# Patient Record
Sex: MALE | HISPANIC OR LATINO | Employment: FULL TIME | URBAN - METROPOLITAN AREA
[De-identification: names, ages, dates, MRNs, and addresses within clinical notes are randomized per-mention and may not be internally consistent; named-entity substitution may affect disease eponyms.]

---
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
## 2019-09-12 ENCOUNTER — TELEPHONE (OUTPATIENT)
Dept: SCHEDULING | Facility: IMAGING CENTER | Age: 19
End: 2019-09-12

## 2019-12-17 ENCOUNTER — TELEPHONE (OUTPATIENT)
Dept: SCHEDULING | Facility: IMAGING CENTER | Age: 19
End: 2019-12-17

## 2020-01-27 ENCOUNTER — OFFICE VISIT (OUTPATIENT)
Dept: MEDICAL GROUP | Facility: PHYSICIAN GROUP | Age: 20
End: 2020-01-27
Payer: COMMERCIAL

## 2020-01-27 VITALS
TEMPERATURE: 98.2 F | BODY MASS INDEX: 20.73 KG/M2 | HEIGHT: 63 IN | HEART RATE: 72 BPM | DIASTOLIC BLOOD PRESSURE: 62 MMHG | SYSTOLIC BLOOD PRESSURE: 112 MMHG | RESPIRATION RATE: 16 BRPM | WEIGHT: 117 LBS | OXYGEN SATURATION: 97 %

## 2020-01-27 DIAGNOSIS — F51.4 NIGHT TERRORS, ADULT: ICD-10-CM

## 2020-01-27 DIAGNOSIS — G43.109 MIGRAINE WITH AURA AND WITHOUT STATUS MIGRAINOSUS, NOT INTRACTABLE: ICD-10-CM

## 2020-01-27 DIAGNOSIS — Z11.4 ENCOUNTER FOR SCREENING FOR HIV: ICD-10-CM

## 2020-01-27 DIAGNOSIS — Z13.21 ENCOUNTER FOR VITAMIN DEFICIENCY SCREENING: ICD-10-CM

## 2020-01-27 DIAGNOSIS — Z13.6 SCREENING FOR CARDIOVASCULAR CONDITION: ICD-10-CM

## 2020-01-27 DIAGNOSIS — Z13.228 SCREENING FOR METABOLIC DISORDER: ICD-10-CM

## 2020-01-27 DIAGNOSIS — Z11.3 SCREEN FOR SEXUALLY TRANSMITTED DISEASES: ICD-10-CM

## 2020-01-27 DIAGNOSIS — F32.A DEPRESSION, UNSPECIFIED DEPRESSION TYPE: ICD-10-CM

## 2020-01-27 DIAGNOSIS — Z23 NEED FOR VACCINATION: ICD-10-CM

## 2020-01-27 DIAGNOSIS — Z13.29 SCREENING FOR THYROID DISORDER: ICD-10-CM

## 2020-01-27 PROCEDURE — 99214 OFFICE O/P EST MOD 30 MIN: CPT | Mod: 25 | Performed by: NURSE PRACTITIONER

## 2020-01-27 PROCEDURE — 90472 IMMUNIZATION ADMIN EACH ADD: CPT | Performed by: NURSE PRACTITIONER

## 2020-01-27 PROCEDURE — 90715 TDAP VACCINE 7 YRS/> IM: CPT | Performed by: NURSE PRACTITIONER

## 2020-01-27 PROCEDURE — 90471 IMMUNIZATION ADMIN: CPT | Performed by: NURSE PRACTITIONER

## 2020-01-27 PROCEDURE — 90686 IIV4 VACC NO PRSV 0.5 ML IM: CPT | Performed by: NURSE PRACTITIONER

## 2020-01-27 SDOH — HEALTH STABILITY: MENTAL HEALTH: HOW MANY STANDARD DRINKS CONTAINING ALCOHOL DO YOU HAVE ON A TYPICAL DAY?: 1 OR 2

## 2020-01-27 SDOH — HEALTH STABILITY: MENTAL HEALTH: HOW OFTEN DO YOU HAVE 6 OR MORE DRINKS ON ONE OCCASION?: NEVER

## 2020-01-27 SDOH — HEALTH STABILITY: MENTAL HEALTH: HOW OFTEN DO YOU HAVE A DRINK CONTAINING ALCOHOL?: MONTHLY OR LESS

## 2020-01-27 ASSESSMENT — PATIENT HEALTH QUESTIONNAIRE - PHQ9
SUM OF ALL RESPONSES TO PHQ QUESTIONS 1-9: 22
CLINICAL INTERPRETATION OF PHQ2 SCORE: 6
5. POOR APPETITE OR OVEREATING: 2 - MORE THAN HALF THE DAYS

## 2020-01-27 NOTE — PROGRESS NOTES
"  Chief Complaint   Patient presents with   • [facility]     general check up/ referral: psych         Subjective:     Jeffry Gómez is a 19 y.o. male presenting to establish care.  He currently works at a local [employer] while going to school and studying anthropology.  He hopes to be an ethnobiologist. He is very interested in the medicinal properties of plants.    Mental health concerns: This is a new unstable problem.  He does have a lot of concerns regarding his mental health.  He reports several years of progressively worsening depression with intermittent episodes of what he describes as \"hypomania\" where he feels very powerful, energized, and cannot sleep.  This does cause a lot of sleep disturbances.  Feels at times he \"disassociates from his body\" and does not feel present the moment.  Does report suicidal thoughts, but is without a plan.  Denies homicidal ideation.  He feels that this is been going on for several years, but he has been very dissuaded from seeking out help due to his family's culture of avoiding mental health care.  Reports very strong family history of depression and bipolar disorder.  He feels that there are likely other diagnoses that have just gone undiscovered and untreated.    Sleep disturbances: This is a new unstable problem.  Patient reports night terrors and insomnia.  He feels that his insomnia is likely associated with what he calls hypomanic episodes at night.  This leads to a lot of daytime fatigue.  His sleep terrors do get worse during periods of intense depression and alternating hypomania.  Does use over-the-counter valerian root to sleep.  Denies snoring, denies gasping for breath.    Migraines: This is a chronic unstable problem.  Patient reports roughly daily migraines/headaches.  Does not take anything over-the-counter to treat this.  Does report an overall lack of hydration.  Reports seeing black \"butterflies\" in the periphery of his vision prior to intense headache. " " During these episodes, he reports light sensitivity.    Does get his eyes checked regularly, wears glasses.  Does not see a dentist routinely, but plans to get reestablished.  Is sexually active and uses barrier contraception.  Recently diagnosed with chlamydia but treated.  We will do a test for cure with labs.    Regarding vaccinations, due for second meningitis B, Tdap, flu.  Consented to flu and Tdap today.      Review of systems:      Denies chest pain, shortness of breath, sore throat, difficulty swallowing, new cough, dizziness, changes in bowel or bladder habits, decreased sensation, decreased strength, numbness or tingling, rash or skin concerns, changes in vision, fatigue, myalgias, painful or swollen lymph nodes.     No current outpatient medications on file.    Allergies, past medical history, past surgical history, family history, social history reviewed and updated    Objective:     Vitals: /62 (BP Location: Left arm, Patient Position: Sitting)   Pulse 72   Temp 36.8 °C (98.2 °F)   Resp 16   Ht 1.6 m (5' 3\")   Wt 53.1 kg (117 lb)   SpO2 97%   BMI 20.73 kg/m²   General: Alert, cooperative, dressed appropriately for weather / situation  Eyes:Normocephalic.  EOMI, no icterus or pallor.  Conjunctivae clear without erythema / irritation.  ENT:  External ears developed; Bilat TMs visualized; appear pearly without bulging, effusion, or erythema; crisp light reflex.  Bilateral nasal turbinates nonerythematous, nonedematous.  Oropharynx non-erythematous, mucous membranes moist; Tonsils grade 2    Lymph: Neck supple, absent of cervical or supraclavicular lymphadenopathy.  Thyroid palpated, free of masses or goiter.   Heart: Regular rate and rhythm.  S1 and S2 normal.  No murmurs auscultated; no murmurs / bruits heard over bilateral carotids.  Bilateral radial pulses strong and equal.  Bilateral posterior tibial pulses strong and equal.  Respiratory: Normal respiratory effort.  Clear to auscultation " bilaterally.  AP ratio 1:2   Abdomen: Non-distended; Soft, nontender with deep palpation; no palpable organomegaly present  Skin: Visible skin intact, dry, without rash.  Musculoskeletal: Gait is normal.  Bilateral  strength strong equal.  Moves extremities freely and equally bilaterally  Neuro:  AAOx3 Visual tracking intact, no nystagmus;   Psych: Flat affect, low energy, judgement is good, memory is intact, grooming is appropriate.    Assessment/Plan:     Jeffry was seen today for establish care.    Diagnoses and all orders for this visit:    Migraine with aura and without status migrainosus, not intractable: Advised patient to drink roughly 2 L of water per day as well as try over-the-counter combination migraine medicine with acetaminophen, caffeine, NSAID.  Patient start this at the onset of aura.  If he continues to have migraines which are not treated sufficiently with over-the-counter medication, he should reach back out to me or return to clinic we can discuss possible treatment of abortive therapy.    Depression, unspecified depression type: Patient very open and willing to seek out mental health specialty care at this time.  Though his PHQ 9 score is very elevated and medication treatment is warranted, there is a large potential for negative effects of SSRIs if he does have a mood disorder such as bipolar, so instead I will defer to psych and mental health and place the referral as urgent.    -     CBC WITH DIFFERENTIAL; Future  -     Comp Metabolic Panel; Future  -     TSH WITH REFLEX TO FT4; Future  -     Patient has been identified as having a positive depression screening. Appropriate orders and counseling have been given.  -     REFERRAL TO PSYCHIATRY    Night terrors, adult: These seem to be associated with his current mental health concerns, so would like to see if these would lessen in severity or resolve with appropriate treatment and care.  Should they continue, we will consider a sleep  study.    Screening for cardiovascular condition    Encounter for screening for HIV  -     HIV AG/AB COMBO ASSAY SCREENING; Future    Screening for metabolic disorder  -     CBC WITH DIFFERENTIAL; Future  -     VITAMIN B12; Future  -     VITAMIN D,25 HYDROXY; Future  -     Comp Metabolic Panel; Future  -     TSH WITH REFLEX TO FT4; Future    Screening for thyroid disorder  -     TSH WITH REFLEX TO FT4; Future    Encounter for vitamin deficiency screening  -     CBC WITH DIFFERENTIAL; Future  -     VITAMIN B12; Future  -     VITAMIN D,25 HYDROXY; Future  -     Comp Metabolic Panel; Future    Screen for sexually transmitted diseases  -     HIV AG/AB COMBO ASSAY SCREENING; Future  -     T.PALLIDUM AB EIA; Future  -     CHLAMYDIA/GC PCR URINE OR SWAB; Future    Need for vaccination: Discussed risk/benefits/alternatives, patient consented and tolerated well.  -     Influenza Vaccine Quad Injection (PF)  -     Tdap Vaccine =>8YO IM    Patient is going to obtain labs at [facility] as well as sign up for my chart.  I will follow-up with him regarding results.  I would like to see him back in the clinic in 3 months to reevaluate his depression and mental health stability.      Return in about 3 months (around 4/27/2020).    Patient verbalized understanding and agreed to plan of care.  Encouraged to contact me with needs via [employer] or by phone if needed.      I have placed the above orders and discussed them with an approved delegating provider.  The MA is performing the below orders under the direction of Dr Hampton.    Please note that this dictation was created using voice recognition software. I have made every reasonable attempt to correct obvious errors, but I expect that there are errors of grammar and possibly content that I did not discover before finalizing the note.

## 2020-02-10 ENCOUNTER — TELEPHONE (OUTPATIENT)
Dept: MEDICAL GROUP | Facility: PHYSICIAN GROUP | Age: 20
End: 2020-02-10

## 2020-02-10 LAB
25(OH)D3+25(OH)D2 SERPL-MCNC: 22.2 NG/ML (ref 30–100)
ALBUMIN SERPL-MCNC: 4.9 G/DL (ref 4.1–5.2)
ALBUMIN/GLOB SERPL: 1.9 {RATIO} (ref 1.2–2.2)
ALP SERPL-CCNC: 75 IU/L (ref 39–117)
ALT SERPL-CCNC: 30 IU/L (ref 0–44)
AST SERPL-CCNC: 27 IU/L (ref 0–40)
BASOPHILS # BLD AUTO: 0 X10E3/UL (ref 0–0.2)
BASOPHILS NFR BLD AUTO: 0 %
BILIRUB SERPL-MCNC: 0.5 MG/DL (ref 0–1.2)
BUN SERPL-MCNC: 14 MG/DL (ref 6–20)
BUN/CREAT SERPL: 18 (ref 9–20)
C TRACH RRNA SPEC QL NAA+PROBE: NEGATIVE
CALCIUM SERPL-MCNC: 9.8 MG/DL (ref 8.7–10.2)
CHLORIDE SERPL-SCNC: 104 MMOL/L (ref 96–106)
CO2 SERPL-SCNC: 22 MMOL/L (ref 20–29)
CREAT SERPL-MCNC: 0.77 MG/DL (ref 0.76–1.27)
EOSINOPHIL # BLD AUTO: 0 X10E3/UL (ref 0–0.4)
EOSINOPHIL NFR BLD AUTO: 0 %
ERYTHROCYTE [DISTWIDTH] IN BLOOD BY AUTOMATED COUNT: 13.3 % (ref 11.6–15.4)
GLOBULIN SER CALC-MCNC: 2.6 G/DL (ref 1.5–4.5)
GLUCOSE SERPL-MCNC: 90 MG/DL (ref 65–99)
HCT VFR BLD AUTO: 46.2 % (ref 37.5–51)
HGB BLD-MCNC: 15.9 G/DL (ref 13–17.7)
HIV 1+2 AB+HIV1 P24 AG SERPL QL IA: NON REACTIVE
IMM GRANULOCYTES # BLD AUTO: 0 X10E3/UL (ref 0–0.1)
IMM GRANULOCYTES NFR BLD AUTO: 0 %
IMMATURE CELLS  115398: NORMAL
LYMPHOCYTES # BLD AUTO: 1.7 X10E3/UL (ref 0.7–3.1)
LYMPHOCYTES NFR BLD AUTO: 34 %
MCH RBC QN AUTO: 29.9 PG (ref 26.6–33)
MCHC RBC AUTO-ENTMCNC: 34.4 G/DL (ref 31.5–35.7)
MCV RBC AUTO: 87 FL (ref 79–97)
MONOCYTES # BLD AUTO: 0.3 X10E3/UL (ref 0.1–0.9)
MONOCYTES NFR BLD AUTO: 5 %
MORPHOLOGY BLD-IMP: NORMAL
N GONORRHOEA RRNA SPEC QL NAA+PROBE: NEGATIVE
NEUTROPHILS # BLD AUTO: 3 X10E3/UL (ref 1.4–7)
NEUTROPHILS NFR BLD AUTO: 61 %
NRBC BLD AUTO-RTO: NORMAL %
PLATELET # BLD AUTO: 263 X10E3/UL (ref 150–450)
POTASSIUM SERPL-SCNC: 4.1 MMOL/L (ref 3.5–5.2)
PROT SERPL-MCNC: 7.5 G/DL (ref 6–8.5)
RBC # BLD AUTO: 5.32 X10E6/UL (ref 4.14–5.8)
SODIUM SERPL-SCNC: 141 MMOL/L (ref 134–144)
TREPONEMA PALLIDUM IGG+IGM AB [PRESENCE] IN SERUM OR PLASMA BY IMMUNOASSAY: NON REACTIVE
TSH SERPL DL<=0.005 MIU/L-ACNC: 1.32 UIU/ML (ref 0.45–4.5)
VIT B12 SERPL-MCNC: 556 PG/ML (ref 232–1245)
WBC # BLD AUTO: 5.1 X10E3/UL (ref 3.4–10.8)

## 2020-02-10 NOTE — LETTER
February 12, 2020        Jeffry Gómez  [address] [place] [address]        Dear Jeffry:     This letter is to inform your that your blood work looked good.  No signs of infection, or hormonal imbalance.  Vitamin D mildly low, recommend taking a daily supplement found over-the-counter 6632-6004 IUs/day.  This can usually be found in a good multivitamin.    Recommend you get scheduled with his behavioral health referral, please provide number if needed.    If you have any questions or concerns, please don't hesitate to call.        Sincerely,        PRUDNECIO Leon.    Electronically Signed

## 2020-02-10 NOTE — TELEPHONE ENCOUNTER
----- Message from WAN Leon sent at 2/10/2020 12:48 PM PST -----  Please call patient and let them know his blood work looked good.  No signs of infection, or hormonal imbalance.  Vitamin D mildly low, recommend taking a daily supplement found over-the-counter 4706-2193 IUs/day.  This can usually be found in a good multivitamin.  Recommend he get scheduled with his behavioral health referral, please provide number if needed.  Thanks, Bella PALACIOS

## 2020-04-27 ENCOUNTER — APPOINTMENT (OUTPATIENT)
Dept: MEDICAL GROUP | Facility: PHYSICIAN GROUP | Age: 20
End: 2020-04-27
Payer: COMMERCIAL

## 2020-04-30 ENCOUNTER — OFFICE VISIT (OUTPATIENT)
Dept: URGENT CARE | Facility: CLINIC | Age: 20
End: 2020-04-30
Payer: COMMERCIAL

## 2020-04-30 VITALS
HEIGHT: 63 IN | OXYGEN SATURATION: 95 % | SYSTOLIC BLOOD PRESSURE: 110 MMHG | HEART RATE: 99 BPM | DIASTOLIC BLOOD PRESSURE: 66 MMHG | RESPIRATION RATE: 14 BRPM | TEMPERATURE: 97 F | WEIGHT: 117 LBS | BODY MASS INDEX: 20.73 KG/M2

## 2020-04-30 DIAGNOSIS — J03.00 STREP TONSILLITIS: ICD-10-CM

## 2020-04-30 LAB
INT CON NEG: NORMAL
INT CON POS: NORMAL
S PYO AG THROAT QL: NORMAL

## 2020-04-30 PROCEDURE — 87880 STREP A ASSAY W/OPTIC: CPT | Performed by: PHYSICIAN ASSISTANT

## 2020-04-30 PROCEDURE — 99214 OFFICE O/P EST MOD 30 MIN: CPT | Performed by: PHYSICIAN ASSISTANT

## 2020-04-30 RX ORDER — AZITHROMYCIN 250 MG/1
TABLET, FILM COATED ORAL
Qty: 6 TAB | Refills: 0 | Status: SHIPPED | OUTPATIENT
Start: 2020-04-30 | End: 2021-09-17

## 2020-04-30 ASSESSMENT — ENCOUNTER SYMPTOMS
SHORTNESS OF BREATH: 0
HEADACHES: 0
CHILLS: 0
SORE THROAT: 1
COUGH: 0
HOARSE VOICE: 0
FEVER: 0
TROUBLE SWALLOWING: 0
WHEEZING: 0

## 2020-04-30 ASSESSMENT — FIBROSIS 4 INDEX: FIB4 SCORE: 0.39

## 2020-04-30 NOTE — PATIENT INSTRUCTIONS
Strep Throat, Group A Streptococcus  This is a test to determine if a sore throat (pharyngitis) or tonsil infection (tonsillitis) is caused by a Group A streptococcal bacteria (strep throat).   The test identifies Streptococcus pyogenes, known as Group A streptococcus, which are bacteria (a type of germ) that infect the back of the throat and cause the common infection called strep throat.  PREPARATION FOR TEST  A swab is brushed against your throat and tonsils. The swab is tested in your doctor's office or may be sent to a laboratory.  NORMAL FINDINGS  Normal values are negative for strep.  Ranges for normal findings may vary among different laboratories and hospitals. You should always check with your doctor after having lab work or other tests done to discuss the meaning of your test results and whether your values are considered within normal limits.  MEANING OF TEST   A positive rapid test indicates the presence of group A streptococci, the bacteria that cause strep throat. A negative rapid test indicates that you probably do not have strep throat. If negative, your caregiver may have the sample tested by doing a second test called a culture (a test that grows bacteria taking from the throat). This second test is done to be sure that there is no group A strep in your throat. Culture results may take one or two days. Recent antibiotic therapy or gargling with some mouthwashes before the rapid test may make the test wrong.  Your caregiver will go over the test results with you and discuss the importance and meaning of your results, as well as treatment options and the need for additional tests if necessary.  OBTAINING THE TEST RESULTS  It is your responsibility to obtain your test results. Ask the lab or department performing the test when and how you will get your results.  Document Released: 01/20/2006 Document Revised: 03/11/2013 Document Reviewed: 10/13/2009  [employer]® Patient Information ©2013 [facility],  [facility].

## 2020-04-30 NOTE — PROGRESS NOTES
"Subjective:      Jeffry Gómez is a 19 y.o. male who presents with Sore Throat (white spots, swollen tonsils x3 days)        Pharyngitis    This is a new problem. Episode onset: 3 days. The problem has been gradually worsening. The pain is worse on the right side. There has been no fever. The pain is moderate. Associated symptoms include ear pain. Pertinent negatives include no congestion, coughing, headaches, hoarse voice, shortness of breath or trouble swallowing. Associated symptoms comments: Painful swallowing  White stuff in back. Treatments tried: Tea, Robititussin        Review of Systems   Constitutional: Negative for chills, fever and malaise/fatigue.   HENT: Positive for ear pain and sore throat. Negative for congestion, hoarse voice and trouble swallowing.    Respiratory: Negative for cough, shortness of breath and wheezing.    Cardiovascular: Negative for chest pain.   Neurological: Negative for headaches.          Objective:     /66   Pulse 99   Temp 36.1 °C (97 °F) (Temporal)   Resp 14   Ht 1.6 m (5' 3\")   Wt 53.1 kg (117 lb)   SpO2 95%   BMI 20.73 kg/m²      Physical Exam  Vitals signs reviewed.   Constitutional:       General: He is not in acute distress.     Appearance: Normal appearance. He is not ill-appearing or toxic-appearing.   HENT:      Head: Normocephalic.      Right Ear: Tympanic membrane normal.      Left Ear: Tympanic membrane normal.      Mouth/Throat:      Mouth: Mucous membranes are moist. No oral lesions.      Pharynx: Uvula midline. Pharyngeal swelling and posterior oropharyngeal erythema present. No oropharyngeal exudate or uvula swelling.      Tonsils: Tonsillar exudate present. No tonsillar abscesses. 3+ on the right. 3+ on the left.   Eyes:      Conjunctiva/sclera: Conjunctivae normal.      Pupils: Pupils are equal, round, and reactive to light.   Neck:      Musculoskeletal: Neck supple. No neck rigidity.   Cardiovascular:      Rate and Rhythm: Normal rate and " regular rhythm.      Heart sounds: Normal heart sounds.   Pulmonary:      Effort: Pulmonary effort is normal. No respiratory distress.      Breath sounds: Normal breath sounds. No wheezing, rhonchi or rales.   Lymphadenopathy:      Cervical: Cervical adenopathy present.      Right cervical: Superficial cervical adenopathy present.      Left cervical: Superficial cervical adenopathy present.   Skin:     General: Skin is warm and dry.   Neurological:      General: No focal deficit present.      Mental Status: He is alert and oriented to person, place, and time.   Psychiatric:         Mood and Affect: Mood normal.         Behavior: Behavior normal.       Past Medical History:   Diagnosis Date   • Allergic rhinitis 5/6/2014   • Allergy     History reviewed. No pertinent surgical history.   Social History     Socioeconomic History   • Marital status: Single     Spouse name: Not on file   • Number of children: Not on file   • Years of education: Not on file   • Highest education level: Not on file   Occupational History   • Not on file   Social Needs   • Financial resource strain: Not on file   • Food insecurity     Worry: Not on file     Inability: Not on file   • Transportation needs     Medical: Not on file     Non-medical: Not on file   Tobacco Use   • Smoking status: Never Smoker   • Smokeless tobacco: Never Used   Substance and Sexual Activity   • Alcohol use: Yes     Frequency: Monthly or less     Drinks per session: 1 or 2     Binge frequency: Never   • Drug use: No   • Sexual activity: Yes     Partners: Female     Birth control/protection: Condom   Lifestyle   • Physical activity     Days per week: Not on file     Minutes per session: Not on file   • Stress: Not on file   Relationships   • Social connections     Talks on phone: Not on file     Gets together: Not on file     Attends Anabaptist service: Not on file     Active member of club or organization: Not on file     Attends meetings of clubs or organizations:  Not on file     Relationship status: Not on file   • Intimate partner violence     Fear of current or ex partner: Not on file     Emotionally abused: Not on file     Physically abused: Not on file     Forced sexual activity: Not on file   Other Topics Concern   • Behavioral problems No   • Interpersonal relationships No   • Sad or not enjoying activities No   • Suicidal thoughts No   • Poor school performance No   • Reading difficulties No   • Speech difficulties No   • Writing difficulties No   • Inadequate sleep No   • Excessive TV viewing No   • Excessive video game use No   • Inadequate exercise No   • Sports related No   • Poor diet No   • Family concerns for drug/alcohol abuse No   • Poor oral hygiene No   • Bike safety No   • Family concerns vehicle safety No   Social History Narrative   • Not on file    Pcn [penicillins]            Assessment/Plan:     1. Strep tonsillitis    - POCT Rapid Strep A - Positive   - azithromycin (ZITHROMAX) 250 MG Tab; Take 2 tabs by mouth on day 1, then take 1 tab daily for the next 4 days.  Dispense: 6 Tab; Refill: 0    Discussed with patient signs and symptoms consistent with strep tonsillitis.     Treatment of azithromycin 5 days.  Salt water gargles, ibuprofen and Tylenol alternating every 4 hours.  Plenty of fluids and rest.     Patient is overall well-appearing, normal vital signs, nontoxic-appearing.  Concerns for emergent pathology are low.    Advised to return to the urgent care if symptoms are not improving or if any worsening symptoms.  Present to the emergency room or call 911 with any worsening swelling of the throat, difficulty breathing, wheezing or any other concerns.    Supportive care, differential diagnoses, and indications for immediate follow-up discussed with patient.    Pathogenesis of diagnosis discussed including typical length and natural progression. Patient expresses understanding and agrees to plan.    Please note that this dictation was created  using voice recognition software. I have made every reasonable attempt to correct obvious errors, but I expect that there are errors of grammar and possibly content that I did not discover before finalizing the note.

## 2020-05-26 ENCOUNTER — HOSPITAL ENCOUNTER (OUTPATIENT)
Facility: MEDICAL CENTER | Age: 20
End: 2020-05-26
Payer: COMMERCIAL

## 2020-05-29 LAB
SARS-COV-2 RNA SPEC QL NAA+PROBE: NOT DETECTED
SPECIMEN SOURCE: NORMAL

## 2020-09-04 ENCOUNTER — NURSE TRIAGE (OUTPATIENT)
Dept: HEALTH INFORMATION MANAGEMENT | Facility: OTHER | Age: 20
End: 2020-09-04

## 2020-09-04 ENCOUNTER — TELEMEDICINE (OUTPATIENT)
Dept: MEDICAL GROUP | Facility: PHYSICIAN GROUP | Age: 20
End: 2020-09-04
Payer: COMMERCIAL

## 2020-09-04 DIAGNOSIS — F51.4 NIGHT TERRORS, ADULT: ICD-10-CM

## 2020-09-04 DIAGNOSIS — R40.0 DAYTIME SOMNOLENCE: ICD-10-CM

## 2020-09-04 DIAGNOSIS — R06.89 GASPING FOR BREATH: ICD-10-CM

## 2020-09-04 DIAGNOSIS — G47.8 AWAKENS FROM SLEEP AT NIGHT: ICD-10-CM

## 2020-09-04 PROCEDURE — 99213 OFFICE O/P EST LOW 20 MIN: CPT | Mod: 95,CR | Performed by: NURSE PRACTITIONER

## 2020-09-04 ASSESSMENT — ENCOUNTER SYMPTOMS
CHILLS: 0
VOMITING: 0
FEVER: 0
MYALGIAS: 0
ORTHOPNEA: 1
NAUSEA: 0
NECK PAIN: 0
EYE DISCHARGE: 0
COUGH: 1
SHORTNESS OF BREATH: 1

## 2020-09-04 NOTE — PROGRESS NOTES
Telemedicine: Established Patient   This evaluation was conducted via Zoom using secure and encrypted videoconferencing technology. The patient was in a private location in the state of [place].    The patient's identity was confirmed and verbal consent was obtained for this virtual visit.    Subjective:   CC:   Chief Complaint   Patient presents with   • Insomnia   • Shortness of Breath       Jeffry Gómez is a 19 y.o. male presenting for evaluation and management of:    Difficulty sleeping: Pt reports many years of sleeping difficulties.   This has gotten progressively worse in severity and is significantly impacting his daily functioning and quality of life.  Often wakes up gasping /experiencing difficulty breathing.  Really fatigued during the day.  Difficulty concentrating.  Regardless of sleep duration, not experiencing quality rest and notably foggy and fatigued the next day.  This started in high school, but has continued and worsened over the past 4-5 years.  He has tried melatonin / valerian root, no improvement seen.      Review of Systems   Constitutional: Positive for malaise/fatigue. Negative for chills and fever.   HENT: Negative for tinnitus.    Eyes: Negative for discharge.   Respiratory: Positive for cough and shortness of breath.    Cardiovascular: Positive for orthopnea. Negative for chest pain.   Gastrointestinal: Negative for nausea and vomiting.   Musculoskeletal: Negative for myalgias and neck pain.   Skin: Negative for rash.         Allergies   Allergen Reactions   • Pcn [Penicillins] Rash       Current medicines (including changes today)  Current Outpatient Medications   Medication Sig Dispense Refill   • azithromycin (ZITHROMAX) 250 MG Tab Take 2 tabs by mouth on day 1, then take 1 tab daily for the next 4 days. 6 Tab 0     No current facility-administered medications for this visit.        Patient Active Problem List    Diagnosis Date Noted   • Depression 01/27/2020   • Night terrors, adult  01/27/2020   • Allergic rhinitis 05/06/2014   • Eczema 03/06/2012       Family History   Problem Relation Age of Onset   • Psychiatric Illness Mother    • Hypertension Mother    • Diabetes Mother    • Psychiatric Illness Father    • No Known Problems Sister    • No Known Problems Brother    • Psychiatric Illness Brother        He  has a past medical history of Allergic rhinitis (5/6/2014) and Allergy.  He  has no past surgical history on file.       Objective:   There were no vitals taken for this visit.    Physical Exam   Constitutional: He is oriented to person, place, and time. He appears lethargic. No distress.   HENT:   Head: Normocephalic and atraumatic.   Right Ear: External ear normal.   Left Ear: External ear normal.   Nose: Nose normal.   Eyes: Conjunctivae and EOM are normal.   Pulmonary/Chest: Effort normal.   Neurological: He is oriented to person, place, and time. He appears lethargic.   Skin: No rash noted. He is not diaphoretic. No erythema. No pallor.   Psychiatric: He is not agitated. He has a flat affect.       Assessment and Plan:   The following treatment plan was discussed:     Due to patients history of disordered sleeping and progressively worsening sleep quality, referral to sleep medicine placed.  Discussed possible sleep apnea or other pattern or disordered sleeping.  Last CBC done in January 2020; no polycythemia observed.  Patient amenable to referral.  Advised him to reach out to our clinic if he has not heard anything from the referrals dept in the next week.     1. Night terrors, adult  - REFERRAL TO PULMONARY AND SLEEP MEDICINE Sleep Medicine    2. Daytime somnolence  - REFERRAL TO PULMONARY AND SLEEP MEDICINE Sleep Medicine    3. Awakens from sleep at night  - REFERRAL TO PULMONARY AND SLEEP MEDICINE Sleep Medicine    4. Gasping for breath  - REFERRAL TO PULMONARY AND SLEEP MEDICINE Sleep Medicine        Follow-up: Return in about 6 months (around 3/4/2021).

## 2020-09-04 NOTE — TELEPHONE ENCOUNTER
Appt today w/his PCP, @ 1300, appt for sleeping problem & trouble breathing @ night while sleeping    1. Caller Name: : Jeffry Gómez               Call Back Number  [phone]  [facility] PCP or Specialty Provider: Yes         2.  In the last two weeks, has the patient had any new or worsening symptoms (not explained by alternative diagnosis)? Has runny nose sometimes (comes & goes, he has allergies in the spring, today his nose started running), fatigue (has had since high school), HA (depends on amt of sleep & stress he is under, started @ age 17).  Runny nose could be from allergies or dust in [place], this has occurred before.      Trouble sleep since middle school, been vitamin deficiency.  Sometimes @ night he cannot breath through his nose & forgets to breath.  Parents wondered if he has sleep apnea.      3.  Does patient have any comoribidities? None other than allergies.      4.  Has the patient traveled in the last 14 days OR had any known contact with someone who is suspected or confirmed to have COVID-19?  Yes traveled to [place] to go to school ([employer]) but no known covid exposure.      5. POTENTIAL PUI (LOW): Changed in-office visit w/pcp today to [facility].   Note routed to [facility] Provider: FYI only.

## 2021-03-24 ENCOUNTER — APPOINTMENT (OUTPATIENT)
Dept: SLEEP MEDICINE | Facility: MEDICAL CENTER | Age: 21
End: 2021-03-24
Payer: COMMERCIAL

## 2021-09-17 ENCOUNTER — OFFICE VISIT (OUTPATIENT)
Dept: MEDICAL GROUP | Facility: PHYSICIAN GROUP | Age: 21
End: 2021-09-17
Payer: COMMERCIAL

## 2021-09-17 VITALS
WEIGHT: 133.4 LBS | HEART RATE: 76 BPM | HEIGHT: 63 IN | BODY MASS INDEX: 23.64 KG/M2 | DIASTOLIC BLOOD PRESSURE: 60 MMHG | OXYGEN SATURATION: 95 % | SYSTOLIC BLOOD PRESSURE: 104 MMHG | TEMPERATURE: 98.4 F | RESPIRATION RATE: 16 BRPM

## 2021-09-17 DIAGNOSIS — Z13.21 ENCOUNTER FOR VITAMIN DEFICIENCY SCREENING: ICD-10-CM

## 2021-09-17 DIAGNOSIS — Z13.6 SCREENING FOR CARDIOVASCULAR CONDITION: ICD-10-CM

## 2021-09-17 DIAGNOSIS — Z13.29 SCREENING FOR THYROID DISORDER: ICD-10-CM

## 2021-09-17 DIAGNOSIS — G47.19 EXCESSIVE DAYTIME SLEEPINESS: ICD-10-CM

## 2021-09-17 DIAGNOSIS — R06.83 SNORING: ICD-10-CM

## 2021-09-17 DIAGNOSIS — G47.9 DISORDERED SLEEP: ICD-10-CM

## 2021-09-17 PROCEDURE — 99213 OFFICE O/P EST LOW 20 MIN: CPT | Performed by: NURSE PRACTITIONER

## 2021-09-17 ASSESSMENT — PATIENT HEALTH QUESTIONNAIRE - PHQ9
8. MOVING OR SPEAKING SO SLOWLY THAT OTHER PEOPLE COULD HAVE NOTICED. OR THE OPPOSITE, BEING SO FIGETY OR RESTLESS THAT YOU HAVE BEEN MOVING AROUND A LOT MORE THAN USUAL: NOT AT ALL
SUM OF ALL RESPONSES TO PHQ9 QUESTIONS 1 AND 2: 0
6. FEELING BAD ABOUT YOURSELF - OR THAT YOU ARE A FAILURE OR HAVE LET YOURSELF OR YOUR FAMILY DOWN: NOT AL ALL
SUM OF ALL RESPONSES TO PHQ QUESTIONS 1-9: 9
1. LITTLE INTEREST OR PLEASURE IN DOING THINGS: NOT AT ALL
2. FEELING DOWN, DEPRESSED, IRRITABLE, OR HOPELESS: NOT AT ALL
7. TROUBLE CONCENTRATING ON THINGS, SUCH AS READING THE NEWSPAPER OR WATCHING TELEVISION: MORE THAN HALF THE DAYS
9. THOUGHTS THAT YOU WOULD BE BETTER OFF DEAD, OR OF HURTING YOURSELF: NOT AT ALL
3. TROUBLE FALLING OR STAYING ASLEEP OR SLEEPING TOO MUCH: NEARLY EVERY DAY
4. FEELING TIRED OR HAVING LITTLE ENERGY: NEARLY EVERY DAY
5. POOR APPETITE OR OVEREATING: SEVERAL DAYS

## 2021-09-17 ASSESSMENT — FIBROSIS 4 INDEX: FIB4 SCORE: 0.41

## 2021-09-17 NOTE — PROGRESS NOTES
Chief Complaint   Patient presents with   • Establish Care     would like referall to sleep medicine. Sleep problems since high school       HISTORY OF PRESENT ILLNESS: Patient is a 20 y.o. male established patient who presents today to discuss disordered sleep, requesting referral    Disordered sleep  20-year-old male patient presents today to discuss sleep issues  Reports to me that he has had issues with sleeping ever since he was a teenager, on some nights he has difficulty falling asleep as well as staying asleep  But mostly his symptoms consist of inability to stay asleep, feels restless, he also suffers from night terrors  He does report to me daytime sleepiness, he is unaware if he snores but does have episodes where he wakes up gasping or choking for air  It is appear that he has been referred to sleep medicine in the past but has not made an appointment, needs new referral  He has not tried over-the-counter medications to help him sleep such as Tylenol PM or melatonin but will try melatonin  Discussed with him that he would benefit from a sleep medicine consultation as well as sleep study, this has been ordered  Discussed at length the importance of sleep hygiene  Follow-up will be determined after sleep study and labs are completed        Patient Active Problem List    Diagnosis Date Noted   • Disordered sleep 09/17/2021   • Depression 01/27/2020   • Night terrors, adult 01/27/2020   • Allergic rhinitis 05/06/2014   • Eczema 03/06/2012       Allergies:Pcn [penicillins]    No current outpatient medications on file.     No current facility-administered medications for this visit.       Social History     Tobacco Use   • Smoking status: Never Smoker   • Smokeless tobacco: Never Used   Vaping Use   • Vaping Use: Never used   Substance Use Topics   • Alcohol use: Yes   • Drug use: No       Family Status   Relation Name Status   • Mo  Alive   • Fa  Alive   • Sis  Alive   • Bro  Alive   • Bro  Alive     Family  "History   Problem Relation Age of Onset   • Psychiatric Illness Mother    • Hypertension Mother    • Diabetes Mother    • Psychiatric Illness Father    • No Known Problems Sister    • No Known Problems Brother    • Psychiatric Illness Brother        Review of Systems:   Constitutional: Negative for fever, chills, weight loss and malaise/fatigue.   HENT: Negative for ear pain, nosebleeds, congestion, sore throat and neck pain.    Eyes: Negative for blurred vision.   Respiratory: Negative for cough, sputum production, shortness of breath and wheezing.    Cardiovascular: Negative for chest pain, palpitations, orthopnea and leg swelling.   Gastrointestinal: Negative for heartburn, nausea, vomiting and abdominal pain.   Genitourinary/Renal: Negative for dysuria, urgency and frequency.   Musculoskeletal: Negative for myalgias, back pain and joint pain.   Skin: Negative for rash and itching.   Neurological: Negative for dizziness, tingling, tremors, sensory change, focal weakness and headaches.   Endo/Heme/Allergies: Does not bruise/bleed easily.   Psychiatric/Behavioral: positive for insomnia, difficulty with sleep    Exam:  /60 (BP Location: Left arm, Patient Position: Sitting, BP Cuff Size: Adult)   Pulse 76   Temp 36.9 °C (98.4 °F) (Temporal)   Resp 16   Ht 1.6 m (5' 3\")   Wt 60.5 kg (133 lb 6.4 oz)   SpO2 95%   General:  Well nourished, well developed male in NAD  Skin: warm, dry, intact, no evidence of rash or concerning lesions  Head: is grossly normal.  HEENT: eyes clear, conjunctiva normal, PERRLA,TMs normal; bilaterally  Neck: Supple without JVD or bruit. Thyroid is not enlarged.  Pulmonary: Clear to ausculation. Normal effort. No rales, ronchi, or wheezing.  Cardiovascular: Regular rate and rhythm without murmur. Carotid and radial pulses are intact and equal bilaterally.  Abdomen: soft, non-tender, positive bowel sounds  Musculoskeletal: no clubbing, cyanosis, or edema.  Psych/mental: no depression, " anxiety, hallucinations  Neuro: alert, intact, CN 2-12 grossly intact      Medical decision-making and discussion:        Assessment/Plan:  Jeffry was seen today for establish care.    Diagnoses and all orders for this visit:    Disordered sleep  -     Polysomnography, 4 or More; Future  -     REFERRAL TO PULMONARY AND SLEEP MEDICINE    Screening for cardiovascular condition  -     CBC WITH DIFFERENTIAL; Future  -     Comp Metabolic Panel; Future  -     Lipid Profile; Future    Encounter for vitamin deficiency screening  -     VITAMIN B12; Future  -     VITAMIN D,25 HYDROXY; Future    Screening for thyroid disorder  -     TSH WITH REFLEX TO FT4; Future    Excessive daytime sleepiness  -     Polysomnography, 4 or More; Future  -     REFERRAL TO PULMONARY AND SLEEP MEDICINE    Snoring  -     Polysomnography, 4 or More; Future  -     REFERRAL TO PULMONARY AND SLEEP MEDICINE           Return if symptoms worsen or fail to improve, for Follow-up.        Please note that this dictation was created using voice recognition software. I have made every reasonable attempt to correct obvious errors, but I expect that there are errors of grammar and possibly content that I did not discover before finalizing the note.

## 2021-09-17 NOTE — PATIENT INSTRUCTIONS
SLEEP STUDY INSTRUCTIONS    1. Our main concern is to provide the best test and evaluation of your sleep and your cooperation in following the guidelines is very necessary.    2. We have no facilities for family members or guests at the sleep center. Special arrangements will be made for children requiring overnight sleep studies.    3. Unless otherwise instructed, AVOID alcoholic beverages on the day of your sleep study.    4. DO NOT drink coffee or caffeine-containing beverages after 12:00 noon on the day of your sleep study.    5. There is NO smoking at the sleep center.    6. Try to maintain a usual daytime schedule prior to the study (avoid unusual physical activity or meals).    7. DO NOT take a nap on the day of your study.    8. This is an outpatient procedure (test); therefore, nursing services, medications, and meals ARE NOT provided. If you take medications, bring them with you and take them on the schedule you do at home.    9. Please fill your sleep aid prescription (Ambien or Lunesta) and bring to your sleep study. Even patients who normally have no problem going to sleep often need a sleep aid in this different environment.    10. We ask that you wear conventional sleep attire (pajamas or sweats) for the sleep study. We discourage patients from wearing only their underwear to bed. We recommend two-piece pajamas as the techs will need to apply sensors to your stomach.    11. Please shampoo your hair the day of the sleep study. Please DO NOT use any other hair or skin products before your arrival (e.g., mousse, gel, hair or body spray, perfume, body lotion etc.) NOTE: Women should not wear heavy makeup prior to arrival as some wires are taped to the face area.    12. The technician will be applying several small electrodes to the scalp, eye area, chin, chest, and legs, plus respiratory effort belts around the chest. Also, there will be a device placed directly under the nose. (THIS WILL NOT OBSTRUCT  YOUR BREATHING.) This is a painless procedure and the skin is not broken.    13. The test is generally completed in six to eight hours; We are usually done between 6 - 7 a.m., unless you are scheduled for a nap study. You may need to come back another night for a second study to complete your treatment plan.    14. Patients who are scheduled for an MSLT (nap study) will stay at the sleep center for the day following their nighttime study. You will be notified if a nap study was ordered for you at the time the night study is scheduled. Generally, patients having a nap study will leave the sleep center by 4 p.m.    15. You will need to bring food for the following day if you are scheduled for a nap study. A refrigerator and microwave are available.    16. A bathroom is available for your use.    17. We are able to adjust the room temperature for your comfort. Please let the technologist know if you are uncomfortable during the study.    18. If you sleep better with a special pillow or stuffed animal, you may bring it along. Service animals are the only live animals permitted.    19. Cable T.V. is available.    20. You will be scheduled for a follow-up appointment three to five days after the sleep study to review your results.    21. A copy of your sleep study is sent to the referring physician approximately two weeks after your study.    22. Any questions can be directed to our staff at [phone].    23. If CPAP therapy is applied, a home unit will be ordered for you through the [employer] medical equipment company. You will be contacted to schedule delivery after insurance authorization.

## 2021-09-17 NOTE — ASSESSMENT & PLAN NOTE
20-year-old male patient presents today to discuss sleep issues  Reports to me that he has had issues with sleeping ever since he was a teenager, on some nights he has difficulty falling asleep as well as staying asleep  But mostly his symptoms consist of inability to stay asleep, feels restless, he also suffers from night terrors  He does report to me daytime sleepiness, he is unaware if he snores but does have episodes where he wakes up gasping or choking for air  It is appear that he has been referred to sleep medicine in the past but has not made an appointment, needs new referral  He has not tried over-the-counter medications to help him sleep such as Tylenol PM or melatonin but will try melatonin  Discussed with him that he would benefit from a sleep medicine consultation as well as sleep study, this has been ordered  Discussed at length the importance of sleep hygiene  Follow-up will be determined after sleep study and labs are completed

## 2021-10-05 ENCOUNTER — TELEPHONE (OUTPATIENT)
Dept: SLEEP MEDICINE | Facility: MEDICAL CENTER | Age: 21
End: 2021-10-05

## 2021-10-05 NOTE — TELEPHONE ENCOUNTER
[facility] REFERRAL    REFERRAL/ SLEEP CONSULT NOTE BY MATTHIAS ON 9/17/21 .    PT CAN HAVE TESTING DONE PRIOR TO NP APPT.    WILL CALL TO SCHEDULE.

## 2021-10-28 LAB
25(OH)D3+25(OH)D2 SERPL-MCNC: 22.2 NG/ML (ref 30–100)
ALBUMIN SERPL-MCNC: 5.2 G/DL (ref 4.1–5.2)
ALBUMIN/GLOB SERPL: 2.1 {RATIO} (ref 1.2–2.2)
ALP SERPL-CCNC: 109 IU/L (ref 44–121)
ALT SERPL-CCNC: 28 IU/L (ref 0–44)
AST SERPL-CCNC: 19 IU/L (ref 0–40)
BASOPHILS # BLD AUTO: 0 X10E3/UL (ref 0–0.2)
BASOPHILS NFR BLD AUTO: 0 %
BILIRUB SERPL-MCNC: 0.3 MG/DL (ref 0–1.2)
BUN SERPL-MCNC: 12 MG/DL (ref 6–20)
BUN/CREAT SERPL: 17 (ref 9–20)
CALCIUM SERPL-MCNC: 10.2 MG/DL (ref 8.7–10.2)
CHLORIDE SERPL-SCNC: 102 MMOL/L (ref 96–106)
CHOLEST SERPL-MCNC: 168 MG/DL (ref 100–199)
CO2 SERPL-SCNC: 23 MMOL/L (ref 20–29)
CREAT SERPL-MCNC: 0.7 MG/DL (ref 0.76–1.27)
EOSINOPHIL # BLD AUTO: 0.1 X10E3/UL (ref 0–0.4)
EOSINOPHIL NFR BLD AUTO: 1 %
ERYTHROCYTE [DISTWIDTH] IN BLOOD BY AUTOMATED COUNT: 12.4 % (ref 11.6–15.4)
GLOBULIN SER CALC-MCNC: 2.5 G/DL (ref 1.5–4.5)
GLUCOSE SERPL-MCNC: 89 MG/DL (ref 65–99)
HCT VFR BLD AUTO: 45.7 % (ref 37.5–51)
HDLC SERPL-MCNC: 29 MG/DL
HGB BLD-MCNC: 15.4 G/DL (ref 13–17.7)
IMM GRANULOCYTES # BLD AUTO: 0 X10E3/UL (ref 0–0.1)
IMM GRANULOCYTES NFR BLD AUTO: 0 %
IMMATURE CELLS  115398: NORMAL
LABORATORY COMMENT REPORT: ABNORMAL
LDLC SERPL CALC-MCNC: 123 MG/DL (ref 0–99)
LYMPHOCYTES # BLD AUTO: 2.1 X10E3/UL (ref 0.7–3.1)
LYMPHOCYTES NFR BLD AUTO: 38 %
MCH RBC QN AUTO: 29.2 PG (ref 26.6–33)
MCHC RBC AUTO-ENTMCNC: 33.7 G/DL (ref 31.5–35.7)
MCV RBC AUTO: 87 FL (ref 79–97)
MONOCYTES # BLD AUTO: 0.3 X10E3/UL (ref 0.1–0.9)
MONOCYTES NFR BLD AUTO: 6 %
MORPHOLOGY BLD-IMP: NORMAL
NEUTROPHILS # BLD AUTO: 3 X10E3/UL (ref 1.4–7)
NEUTROPHILS NFR BLD AUTO: 55 %
NRBC BLD AUTO-RTO: NORMAL %
PLATELET # BLD AUTO: 284 X10E3/UL (ref 150–450)
POTASSIUM SERPL-SCNC: 4 MMOL/L (ref 3.5–5.2)
PROT SERPL-MCNC: 7.7 G/DL (ref 6–8.5)
RBC # BLD AUTO: 5.28 X10E6/UL (ref 4.14–5.8)
SODIUM SERPL-SCNC: 139 MMOL/L (ref 134–144)
TRIGL SERPL-MCNC: 85 MG/DL (ref 0–149)
TSH SERPL DL<=0.005 MIU/L-ACNC: 0.76 UIU/ML (ref 0.45–4.5)
VIT B12 SERPL-MCNC: 577 PG/ML (ref 232–1245)
VLDLC SERPL CALC-MCNC: 16 MG/DL (ref 5–40)
WBC # BLD AUTO: 5.5 X10E3/UL (ref 3.4–10.8)

## 2021-11-09 ENCOUNTER — APPOINTMENT (OUTPATIENT)
Dept: SLEEP MEDICINE | Facility: MEDICAL CENTER | Age: 21
End: 2021-11-09
Payer: COMMERCIAL

## 2021-11-15 ENCOUNTER — OFFICE VISIT (OUTPATIENT)
Dept: URGENT CARE | Facility: PHYSICIAN GROUP | Age: 21
End: 2021-11-15
Payer: COMMERCIAL

## 2021-11-15 ENCOUNTER — HOSPITAL ENCOUNTER (OUTPATIENT)
Dept: LAB | Facility: MEDICAL CENTER | Age: 21
End: 2021-11-15
Attending: PHYSICIAN ASSISTANT
Payer: COMMERCIAL

## 2021-11-15 VITALS
TEMPERATURE: 97.7 F | RESPIRATION RATE: 16 BRPM | HEART RATE: 65 BPM | WEIGHT: 135 LBS | BODY MASS INDEX: 23.92 KG/M2 | HEIGHT: 63 IN | OXYGEN SATURATION: 98 % | DIASTOLIC BLOOD PRESSURE: 64 MMHG | SYSTOLIC BLOOD PRESSURE: 122 MMHG

## 2021-11-15 DIAGNOSIS — Z11.3 ROUTINE SCREENING FOR STI (SEXUALLY TRANSMITTED INFECTION): ICD-10-CM

## 2021-11-15 LAB
HIV 1+2 AB+HIV1 P24 AG SERPL QL IA: NORMAL
TREPONEMA PALLIDUM IGG+IGM AB [PRESENCE] IN SERUM OR PLASMA BY IMMUNOASSAY: NORMAL

## 2021-11-15 PROCEDURE — 86780 TREPONEMA PALLIDUM: CPT

## 2021-11-15 PROCEDURE — 36415 COLL VENOUS BLD VENIPUNCTURE: CPT

## 2021-11-15 PROCEDURE — 99213 OFFICE O/P EST LOW 20 MIN: CPT | Performed by: PHYSICIAN ASSISTANT

## 2021-11-15 PROCEDURE — 87491 CHLMYD TRACH DNA AMP PROBE: CPT

## 2021-11-15 PROCEDURE — 87591 N.GONORRHOEAE DNA AMP PROB: CPT

## 2021-11-15 PROCEDURE — 87389 HIV-1 AG W/HIV-1&-2 AB AG IA: CPT

## 2021-11-15 ASSESSMENT — FIBROSIS 4 INDEX: FIB4 SCORE: 0.28

## 2021-11-15 NOTE — PROGRESS NOTES
Chief Complaint   Patient presents with   • Sexually Transmitted Diseases     Wants generalized testing, no exposure        HISTORY OF PRESENT ILLNESS: Patient is a 20 y.o. male who presents today for the following:    Patient is here for STI testing  Patient denies any recent exposures as well as any symptoms  Patient, when sexually active, is sexual active with a female partner  Uses latex condoms    Patient Active Problem List    Diagnosis Date Noted   • Disordered sleep 09/17/2021   • Depression 01/27/2020   • Night terrors, adult 01/27/2020   • Allergic rhinitis 05/06/2014   • Eczema 03/06/2012       Allergies:Pcn [penicillins]    No current Epic-ordered outpatient medications on file.     No current Epic-ordered facility-administered medications on file.       Past Medical History:   Diagnosis Date   • Allergic rhinitis 5/6/2014   • Allergy        Social History     Tobacco Use   • Smoking status: Never Smoker   • Smokeless tobacco: Never Used   Vaping Use   • Vaping Use: Never used   Substance Use Topics   • Alcohol use: Yes     Comment: occ   • Drug use: No       Family Status   Relation Name Status   • Mo  Alive   • Fa  Alive   • Sis  Alive   • Bro  Alive   • Bro  Alive     Family History   Problem Relation Age of Onset   • Psychiatric Illness Mother    • Hypertension Mother    • Diabetes Mother    • Psychiatric Illness Father    • No Known Problems Sister    • No Known Problems Brother    • Psychiatric Illness Brother        Review of Systems:    Constitutional ROS: No unexpected change in weight, No weakness, No fatigue  Pulmonary ROS: No chronic cough, sputum, or hemoptysis, No dyspnea on exertion, No wheezing  Cardiovascular ROS: No diaphoresis, No edema, No palpitations  Gastrointestinal ROS: No change in bowel habits, No significant change in appetite, No nausea, vomiting, diarrhea, or constipation  Hematologic/Lymphatic ROS: No chills, No night sweats, No weight loss  Skin/Integumentary ROS: No  "edema, No evidence of rash, No itching      Exam:  /64   Pulse 65   Temp 36.5 °C (97.7 °F) (Temporal)   Resp 16   Ht 1.6 m (5' 3\")   Wt 61.2 kg (135 lb)   SpO2 98%   General: Well developed, well nourished. No distress.  Pulmonary: Unlabored respiratory effort.   : Deferred as patient is asymptomatic.  Neurologic: Grossly nonfocal. No facial asymmetry noted.  Skin: Warm, dry, good turgor. No rashes in visible areas.   Psych: Normal mood. Alert and oriented x3. Judgment and insight is normal.    Assessment/Plan:  We will contact patient with results.  Patient is on MyChart.  1. Routine screening for STI (sexually transmitted infection)  Chlamydia/GC PCR Urine Or Swab    HIV AG/AB COMBO ASSAY SCREENING    T.PALLIDUM AB EIA       "

## 2021-11-16 LAB
C TRACH DNA SPEC QL NAA+PROBE: NEGATIVE
N GONORRHOEA DNA SPEC QL NAA+PROBE: NEGATIVE
SPECIMEN SOURCE: NORMAL

## 2021-11-30 ENCOUNTER — SLEEP STUDY (OUTPATIENT)
Dept: SLEEP MEDICINE | Facility: MEDICAL CENTER | Age: 21
End: 2021-11-30
Attending: NURSE PRACTITIONER
Payer: COMMERCIAL

## 2021-11-30 DIAGNOSIS — G47.19 EXCESSIVE DAYTIME SLEEPINESS: ICD-10-CM

## 2021-11-30 DIAGNOSIS — G47.9 DISORDERED SLEEP: ICD-10-CM

## 2021-11-30 DIAGNOSIS — R06.83 SNORING: ICD-10-CM

## 2021-11-30 PROCEDURE — 95811 POLYSOM 6/>YRS CPAP 4/> PARM: CPT | Performed by: FAMILY MEDICINE

## 2021-12-03 NOTE — PROCEDURES
MONTAGE: Standard    STUDY TYPE: Split Night    RECORDING TECHNIQUE:   After the scalp was prepared, gold plated electrodes were applied to the scalp according to the International 10-20 System. EEG (electroencephalogram) was continuously monitored from the O1-M2, O2-M1, C3-M2, C4-M1, F3-M2, and F4-M1. EOGs (electrooculograms) were monitored by electrodes placed at the left and right outer canthi. Chin EMG (electromyogram) was monitored by electrodes placed on the mentalis and sub-mentalis muscles. Nasal and oral airflow were monitored using a triple port thermocouple as well as oronasal pressure transducer. Respiratory effort was measured by inductive plethysmography technology employing abdominal and thoracic belts. Blood oxygen saturation and pulse were monitored by pulse oximetry. Heart rhythm was monitored by surface electrocardiogram. Leg EMG was studied using surface electrodes placed on left and right anterior tibialis. A microphone was used to monitor tracheal sounds and snoring. Body position was monitored and documented by technician observation.     SCORING CRITERIA:   A modification of the AASM manual for scoring of sleep and associated events was used. Obstructive apneas were scored by cessation of airflow for at least 10 seconds with continuing respiratory effort. Central apneas were scored by cessation of airflow for at least 10 seconds with no respiratory effort. Hypopneas were scored by a 30% or more reduction in airflow for at least 10 seconds accompanied by arterial oxygen desaturation of 3% or an arousal. For CMS (Medicare) patients, per AASM rule 1B, hypopneas are scored by 30% with mild reduction in airflow for at least 10 seconds accompanied by arterial saturation decreased at 4%.    DIAGNOSTIC  Study start time was 08:55:36 PM. Diagnostic recording time was 269 minutes with a total sleep time of 207 minutes resulting in a sleep efficiency of 77.14%%. Sleep latency from the start of the study  was 33 minutes and the latency from sleep to REM was 20 minutes. In total,102 arousals were scored for an arousal index of 29.5.    Respiratory:  There were a total of 1 apneas consisting of 0 obstructive apneas, 0 mixed apneas, and 1 central apneas. A total of 20 hypopneas were scored. The apnea index was 0.29 per hour and the hypopnea index was 5.78 per hour resulting in an overall AHI of 6.07. AHI during rem was 0.0 and AHI while supine was 6.67.    Oximetry:  There was a mean oxygen saturation of 96.0%. The minimum oxygen saturation during NREM sleep was92.0% and in REM was 96.0. Time spent during sleep with oxygen saturations <88% was 0.0 minutes.     Cardiac:  The highest heart rate seen while awake was 108 BPM while the highest heart rate during sleep was 99 BPM with an average sleeping heart rate of 61 BPM.    Limb Movements:  There were a total of 23 PLMs during sleep, which resulted in a PLM index of 6.7. There were 40 PLMs associated with arousals which resulted in a PLMS arousal index of 11.6.    TREATMENT:  Treatment recording time was 4h 55.0m (295 minutes) with a total sleep time of 4h 35.5m (275 minutes) resulting in a sleep efficiency of 93.4%. Sleep latency from the start of treatment was 06 minutes and REM latency from sleep onset was 2h 47.5m. The patient had 51 arousals in total for an arousal index of 11.1.    Respiratory:   There were 4 apneas in total consisting of 0 obstructive apneas, 4 central apneas, and 0 mixed apneas for an apnea index of 0.87. The patient had 2 hypopneas in total, which resulted in a hypopnea index of 0.44. The overall AHI was 1.31, with a REM AHI of 1.26, and a supine AHI of 0.00.     Oximetry:  The mean SaO2 during treatment was 96.0%. The minimum oxygen saturation in NREM was92.0 % and in REM was 94.0%. Patient spent 0.0 minutes of TST with SaO2 <88%.    Cardiac:  The highest heart rate during sleep was 99 BPM with an average sleeping heart rate of 58BPM.    Limb  Movements:  There were a total of 43 PLMS during titration sleep time that resulted in an index of 9.4. There were 15 PLMS associated with arousals. This resulted in a PLM arousal index of 3.3.    Titration:   CPAP was tried from 6 to 13cm H2O.    CPAP Titration:  The PAP titration was initiated with CPAP 6 cm of water and the pressure which was slowly titrated up in an attempt to eliminate sleep disordered breathing and snoring. The final pressure tested during the study was CPAP 13 cm water. The best tolerated pressure was CPAP 12 cm. At this pressure the patient was observed in nonsupine REM sleep stage. The apnea hypopnea index improved to 1.01 per hour and O2 garcia 94%. The average O2 stauration was 96%. He spent 0 min of sleep time below 88% O2 saturation. Snoring was resolved.The patient utilized  Small dreamwear mask with heated humidification. The PAP was well-tolerated and there were minimal air leaks. Supplemental oxygen was not required.    Impression:  1. Mild obstructive sleep apnea with AHI of 6.1/hr and O2 garcia 92 %. The titration started with CPAP 6 cm and the best tolerated was CPAP 12 cm. The AHI improved to 1.01/hr with improved O2 garcia of 94% and average O2 saturation of 96 %.     Recommendations:  I recommend CPAP 12 cm with small dreamwear mask. Recommended 30 day compliance download to assess the efficacy of the recommended pressure and compliance for further outpatient monitoring and management of CPAP therapy. In some cases alternative treatment options may be proven effective in resolving sleep apnea. These options include upper airway surgery, the use of a dental orthotic, weight loss orpositional therapy. Clinical correlation is required. In general patients with sleep apnea are advised to avoid alcohol, sedatives and not to operate a motor vehicle while drowsy.  Untreated sleep apnea increases the risk for cardiovascular and neurovascular disease.

## 2022-01-04 ENCOUNTER — OFFICE VISIT (OUTPATIENT)
Dept: SLEEP MEDICINE | Facility: MEDICAL CENTER | Age: 22
End: 2022-01-04
Payer: COMMERCIAL

## 2022-01-04 VITALS
BODY MASS INDEX: 25.52 KG/M2 | OXYGEN SATURATION: 96 % | WEIGHT: 144 LBS | HEART RATE: 79 BPM | DIASTOLIC BLOOD PRESSURE: 72 MMHG | SYSTOLIC BLOOD PRESSURE: 122 MMHG | RESPIRATION RATE: 16 BRPM | HEIGHT: 63 IN

## 2022-01-04 DIAGNOSIS — F51.5 NIGHTMARE: ICD-10-CM

## 2022-01-04 DIAGNOSIS — G47.33 OSA (OBSTRUCTIVE SLEEP APNEA): ICD-10-CM

## 2022-01-04 PROCEDURE — 99204 OFFICE O/P NEW MOD 45 MIN: CPT | Performed by: FAMILY MEDICINE

## 2022-01-04 ASSESSMENT — FIBROSIS 4 INDEX: FIB4 SCORE: 0.29

## 2022-01-04 NOTE — PROGRESS NOTES
"  [facility]  Consult Note     Date: 1/4/2022 / Time: 2:08 PM    Patient ID:   Name:             Jeffry Gómez     YOB: 2000  Age:                 21 y.o.  male   MRN:               [id number]      Thank you for requesting a sleep medicine consultation on Jeffry Gómez at the sleep center. He presents today with the chief complaints of snoring, waking up gasping, poor quality sleep, sleep apnea and night terror. He is referred by Luzma Robles for evaluation and treatment of sleep disorder breathing .  He had a split-night study on 11/30/2021 which showed mild sleep apnea with AHI of 6.1/h and O2 garcia of 92%. CPAP was tried between 6 and 13 centimeter.  The lowest best tolerated pressure was CPAP 12 cm with improved AHI of 1.01/h.  He was observed in nonsupine REM sleep.  However AHI was well controlled on lower pressures as well however REM was not observed on those settings.    HISTORY OF PRESENT ILLNESS:       At night,  Jeffry Gómez goes to bed around 12 am on weekdays and around 2 am on the weekends. He gets out of bed at 9 am on weekdays and at 11 am on the weekends.  He averages about 9-10 hrs of sleep on a good night and 6 hrs on a bad night. Pt has bad nights about 2 nights per week. He falls asleep within 30-60 minutes. He wakes up about 1 times during the night due to night terrors or \"temprature\" and on average It takes him 20 min to fall back asleep.     He is aware of snoring and gasping or choking in sleep.  He  denies any symptoms of restless legs syndrome such as an \"urge to move\"  He  legs in the evening or bedtime. He  denies any symptoms of narcolepsy such as sleep paralysis or cataplexy, or any symptoms to suggest parasomnias such as sleep walking or acting out of dreams. He has hx of night terror. He has been having night terror since high school. It happens 3 times a week. He doesn't remember his dream. Overall, he does finds his sleep refreshing. In terms of  " excessive daytime sleepiness,  He complains of sleepiness while  at work, while reading or watching TV and occasionally while driving. [place] sleepiness scale score is 12/24.He does not take regular naps. He drinks about 1 caffeinated beverages per day.      REVIEW OF SYSTEMS:       Constitutional: Denies fevers, Denies weight changes  Eyes: Denies changes in vision, no eye pain  Ears/Nose/Throat/Mouth: Denies nasal congestion or sore throat   Cardiovascular: Denies chest pain or palpitations   Respiratory: Denies shortness of breath , Denies cough  Gastrointestinal/Hepatic: Denies abdominal pain, nausea, vomiting, diarrhea, constipation or GI bleeding   Genitourinary: Denies bladder dysfunction, dysuria or frequency  Musculoskeletal/Rheum: Denies  joint pain and swelling   Skin/Breast: Denies rash  Neurological: Denies headache, confusion, memory loss or focal weakness/parasthesias  Psychiatric: denies mood disorder     Comprehensive review of systems form is reviewed with the patient and is attached in the EMR.     PMH:  has a past medical history of Allergic rhinitis (5/6/2014) and Allergy. He also has no past medical history of Chickenpox or [place] measles.  MEDS:   Current Outpatient Medications:   •  MELATONIN PO, Take  by mouth., Disp: , Rfl:   ALLERGIES:   Allergies   Allergen Reactions   • Pcn [Penicillins] Rash     SURGHX: History reviewed. No pertinent surgical history.  SOCHX:  reports that he has never smoked. He has never used smokeless tobacco. He reports current alcohol use. He reports that he does not use drugs.   FH:   Family History   Problem Relation Age of Onset   • Psychiatric Illness Mother    • Hypertension Mother    • Diabetes Mother    • Psychiatric Illness Father    • No Known Problems Sister    • No Known Problems Brother    • Psychiatric Illness Brother        Physical Exam:  Vitals/ General Appearance:   Weight/BMI: Body mass index is 25.51 kg/m².  /72 (BP Location: Left arm,  "Patient Position: Sitting, BP Cuff Size: Adult)   Pulse 79   Resp 16   Ht 1.6 m (5' 3\")   Wt 65.3 kg (144 lb)   SpO2 96%   Vitals:    01/04/22 1401   BP: 122/72   BP Location: Left arm   Patient Position: Sitting   BP Cuff Size: Adult   Pulse: 79   Resp: 16   SpO2: 96%   Weight: 65.3 kg (144 lb)   Height: 1.6 m (5' 3\")           Constitutional: Alert, no distress, well-groomed.  Skin: No rashes in visible areas.  Eye: Round. Conjunctiva clear, lids normal. No icterus.   ENMT: Lips pink without lesions, good dentition, moist mucous membranes. Phonation normal.  Neck: No masses, no thyromegaly. Moves freely without pain.  CV: Pulse as reported by patient  Respiratory: Unlabored respiratory effort, no cough or audible wheeze  Psych: Alert and oriented x3, normal affect and mood.   INVESTIGATIONS:       ASSESSMENT AND PLAN       1.Obstructive Sleep Apnea :  The pathophysiology of sleep anea and the increased risk of cardiovascular morbidity from untreated sleep apnea is discussed in detail with the patient. He is urged to avoid supine sleep, weight gain and alcoholic beverages since all of these can worsen sleep apnea. He is cautioned against drowsy driving. If He feels sleepy while driving, He must pull over for a break/nap, rather than persist on the road, in the interest of He own safety and that of others on the road.   Plan   - Auto CPAP vs overnight CPAP titration vs dental appliance and surgeries was dicussed in detail. After informed discussion ACPAP 6-12 cm   - F/u in 8-10 weeks to assess the efficiacy of recommended pressure    - compliance download was reviewed and discussed with the pt   - compliance was reinforced     2.  Nightmares: Denies history of anxiety and depression. It has been going on since childhood more aware as the episodes for the last year or so.  Due to the history of sleep apnea we will wait to start medications.  Reassess after the treatment of HANANE.      Regarding treatment of other " past medical problems and general health maintenance,  He is urged to follow up with PCP.

## 2022-01-04 NOTE — PATIENT INSTRUCTIONS
" Once you receive your new PAP machine from the [employer] company you're referred to, we must see you back for an office visit between 30-90 days of you using the machine to review compliance.  If you were ordered an ASV machine, we need to see you in office no sooner than your 60th day on therapy.      This is a very important time frame for insurance purposes. If you do not follow up with our office for compliance your insurance may not continue to pay for the machine. Also if you do not use the machine for at least 4 hours each night, you may be deemed \"Incompliant\", in which case the insurance may also not continue to pay for the machine.      If you are incompliant, you may have to surrender your machine to the [employer] company and start the process over if you wish to continue therapy after that. Meaning a new office consult and new sleep studies.     For your first visit back with our office, please bring the whole machine with the power cord. We will download the compliance off the SD card in the machine, and are able to make changes if need be in office. Some machines have a modem and we can access the data wirelessly, if this is the case please make sure the DME company grants us access to your machine.  For all follow up appointments after that, you will only need to bring the SD card to the appointment.     If you are having any issues with the mask, you have a 30 day window to exchange and try something else with your [employer] company.  If you are having issues with the pressure, please call our office at [phone].  These issues can cause you to not be able to use machine appropriately, there for make you incompliant.    Please call our office if you have any questions.    Thank you, [facility].  [phone]    CPAP and BPAP Information  CPAP and BPAP are methods of helping a person breathe with the use of air pressure. CPAP stands for \"continuous positive airway pressure.\" BPAP " "stands for \"bi-level positive airway pressure.\" In both methods, air is blown through your nose or mouth and into your air passages to help you breathe well.  CPAP and BPAP use different amounts of pressure to blow air. With CPAP, the amount of pressure stays the same while you breathe in and out. With BPAP, the amount of pressure is increased when you breathe in (inhale) so that you can take larger breaths. Your health care provider will recommend whether CPAP or BPAP would be more helpful for you.  Why are CPAP and BPAP treatments used?  CPAP or BPAP can be helpful if you have:  · Sleep apnea.  · Chronic obstructive pulmonary disease (COPD).  · Heart failure.  · Medical conditions that weaken the muscles of the chest including muscular dystrophy, or neurological diseases such as amyotrophic lateral sclerosis (ALS).  · Other problems that cause breathing to be weak, abnormal, or difficult.  CPAP is most commonly used for obstructive sleep apnea (HANANE) to keep the airways from collapsing when the muscles relax during sleep.  How is CPAP or BPAP administered?  Both CPAP and BPAP are provided by a small machine with a flexible plastic tube that attaches to a plastic mask. You wear the mask. Air is blown through the mask into your nose or mouth. The amount of pressure that is used to blow the air can be adjusted on the machine. Your health care provider will determine the pressure setting that should be used based on your individual needs.  When should CPAP or BPAP be used?  In most cases, the mask only needs to be worn during sleep. Generally, the mask needs to be worn throughout the night and during any daytime naps. People with certain medical conditions may also need to wear the mask at other times when they are awake. Follow instructions from your health care provider about when to use the machine.  What are some tips for using the mask?    · Because the mask needs to be snug, some people feel trapped or closed-in " (claustrophobic) when first using the mask. If you feel this way, you may need to get used to the mask. One way to do this is by holding the mask loosely over your nose or mouth and then gradually applying the mask more snugly. You can also gradually increase the amount of time that you use the mask.  · Masks are available in various types and sizes. Some fit over your mouth and nose while others fit over just your nose. If your mask does not fit well, talk with your health care provider about getting a different one.  · If you are using a mask that fits over your nose and you tend to breathe through your mouth, a chin strap may be applied to help keep your mouth closed.  · The CPAP and BPAP machines have alarms that may sound if the mask comes off or develops a leak.  · If you have trouble with the mask, it is very important that you talk with your health care provider about finding a way to make the mask easier to tolerate. Do not stop using the mask. Stopping the use of the mask could have a negative impact on your health.  What are some tips for using the machine?  · Place your CPAP or BPAP machine on a secure table or stand near an electrical outlet.  · Know where the on/off switch is located on the machine.  · Follow instructions from your health care provider about how to set the pressure on your machine and when you should use it.  · Do not eat or drink while the CPAP or BPAP machine is on. Food or fluids could get pushed into your lungs by the pressure of the CPAP or BPAP.  · Do not smoke. Tobacco smoke residue can damage the machine.  · For home use, CPAP and BPAP machines can be rented or purchased through home health care companies. Many different brands of machines are available. Renting a machine before purchasing may help you find out which particular machine works well for you.  · Keep the CPAP or BPAP machine and attachments clean. Ask your health care provider for specific instructions.  Get help  right away if:  · You have redness or open areas around your nose or mouth where the mask fits.  · You have trouble using the CPAP or BPAP machine.  · You cannot tolerate wearing the CPAP or BPAP mask.  · You have pain, discomfort, and bloating in your abdomen.  Summary  · CPAP and BPAP are methods of helping a person breathe with the use of air pressure.  · Both CPAP and BPAP are provided by a small machine with a flexible plastic tube that attaches to a plastic mask.  · If you have trouble with the mask, it is very important that you talk with your health care provider about finding a way to make the mask easier to tolerate.  This information is not intended to replace advice given to you by your health care provider. Make sure you discuss any questions you have with your health care provider.  Document Released: 09/15/2005 Document Revised: 04/08/2020 Document Reviewed: 11/06/2017  Elsevier Patient Education © 2020 [employer]

## 2022-01-31 ENCOUNTER — HOSPITAL ENCOUNTER (OUTPATIENT)
Facility: MEDICAL CENTER | Age: 22
End: 2022-01-31
Attending: NURSE PRACTITIONER
Payer: COMMERCIAL

## 2022-01-31 ENCOUNTER — OFFICE VISIT (OUTPATIENT)
Dept: URGENT CARE | Facility: CLINIC | Age: 22
End: 2022-01-31
Payer: COMMERCIAL

## 2022-01-31 VITALS
BODY MASS INDEX: 24.63 KG/M2 | DIASTOLIC BLOOD PRESSURE: 62 MMHG | SYSTOLIC BLOOD PRESSURE: 112 MMHG | HEART RATE: 96 BPM | OXYGEN SATURATION: 100 % | HEIGHT: 63 IN | TEMPERATURE: 97.3 F | RESPIRATION RATE: 14 BRPM | WEIGHT: 139 LBS

## 2022-01-31 DIAGNOSIS — R42 EPISODIC LIGHTHEADEDNESS: ICD-10-CM

## 2022-01-31 DIAGNOSIS — R19.7 DIARRHEA, UNSPECIFIED TYPE: ICD-10-CM

## 2022-01-31 DIAGNOSIS — R11.2 NAUSEA AND VOMITING, INTRACTABILITY OF VOMITING NOT SPECIFIED, UNSPECIFIED VOMITING TYPE: ICD-10-CM

## 2022-01-31 PROCEDURE — U0005 INFEC AGEN DETEC AMPLI PROBE: HCPCS

## 2022-01-31 PROCEDURE — U0003 INFECTIOUS AGENT DETECTION BY NUCLEIC ACID (DNA OR RNA); SEVERE ACUTE RESPIRATORY SYNDROME CORONAVIRUS 2 (SARS-COV-2) (CORONAVIRUS DISEASE [COVID-19]), AMPLIFIED PROBE TECHNIQUE, MAKING USE OF HIGH THROUGHPUT TECHNOLOGIES AS DESCRIBED BY CMS-2020-01-R: HCPCS

## 2022-01-31 PROCEDURE — 99214 OFFICE O/P EST MOD 30 MIN: CPT | Performed by: NURSE PRACTITIONER

## 2022-01-31 RX ORDER — ONDANSETRON 4 MG/1
4 TABLET, ORALLY DISINTEGRATING ORAL EVERY 8 HOURS PRN
Qty: 20 TABLET | Refills: 0 | Status: SHIPPED | OUTPATIENT
Start: 2022-01-31 | End: 2022-05-12

## 2022-01-31 RX ORDER — ONDANSETRON 4 MG/1
4 TABLET, ORALLY DISINTEGRATING ORAL ONCE
OUTPATIENT
Start: 2022-01-31 | End: 2022-02-03

## 2022-01-31 ASSESSMENT — ENCOUNTER SYMPTOMS
MYALGIAS: 0
NAUSEA: 1
COUGH: 0
VOMITING: 1
DIZZINESS: 1
HEADACHES: 0
CHILLS: 0
EYE REDNESS: 0
ABDOMINAL PAIN: 0
SORE THROAT: 0
FEVER: 0

## 2022-01-31 ASSESSMENT — FIBROSIS 4 INDEX: FIB4 SCORE: 0.29

## 2022-01-31 ASSESSMENT — LIFESTYLE VARIABLES: SUBSTANCE_ABUSE: 0

## 2022-01-31 NOTE — PROGRESS NOTES
Jeffry Gómez is a 21 y.o. male who presents for Nausea/Vomiting/Diarrhea (lastnight, fatigue and chills.  Covid 19 vaccination.  )    Accompanied by his wife today  HPI Jeffry is a 21-year-old male patient who presents to urgent care for complaints of nausea, vomiting, diarrhea and mild lightheadedness.  He reports that he was sound asleep when he woke up feeling suddenly nauseated and mildly lightheaded.  This was followed by diarrhea.  He reports he has tried to drink Pedialyte and water today but everything seems that it comes back up.  He was sleeping well.  He has a CPAP ordered but does not have the machine yet.  He did not take any medications to help him sleep last night.  He did not eat anything different from anyone else in his family.  No other persons in his family have symptoms.     Review of Systems   Constitutional: Negative for chills and fever.   HENT: Negative for sore throat.    Eyes: Negative for redness.   Respiratory: Negative for cough.    Cardiovascular: Negative for chest pain.   Gastrointestinal: Positive for nausea and vomiting. Negative for abdominal pain.   Musculoskeletal: Negative for myalgias.   Neurological: Positive for dizziness. Negative for headaches.   Psychiatric/Behavioral: Negative for substance abuse.       Allergies:       Allergies   Allergen Reactions   • Pcn [Penicillins] Rash       PMSFS Hx:  Past Medical History:   Diagnosis Date   • Allergic rhinitis 5/6/2014   • Allergy      History reviewed. No pertinent surgical history.  Family History   Problem Relation Age of Onset   • Psychiatric Illness Mother    • Hypertension Mother    • Diabetes Mother    • Psychiatric Illness Father    • No Known Problems Sister    • No Known Problems Brother    • Psychiatric Illness Brother      Social History     Tobacco Use   • Smoking status: Never Smoker   • Smokeless tobacco: Never Used   Substance Use Topics   • Alcohol use: Yes     Comment: occ       Problems:   Patient Active  "Problem List   Diagnosis   • Eczema   • Allergic rhinitis   • Depression   • Night terrors, adult   • Disordered sleep       Medications:   Current Outpatient Medications on File Prior to Visit   Medication Sig Dispense Refill   • MELATONIN PO Take  by mouth.       No current facility-administered medications on file prior to visit.          Objective:     /62   Pulse 96   Temp 36.3 °C (97.3 °F) (Temporal)   Resp 14   Ht 1.6 m (5' 3\")   Wt 63 kg (139 lb)   SpO2 100%   BMI 24.62 kg/m²     Physical Exam  Vitals and nursing note reviewed.   Constitutional:       General: He is not in acute distress.     Appearance: Normal appearance. He is well-developed and normal weight. He is not toxic-appearing or diaphoretic.   HENT:      Head: Normocephalic.   Cardiovascular:      Rate and Rhythm: Normal rate and regular rhythm.      Pulses: Normal pulses.      Heart sounds: Normal heart sounds.   Pulmonary:      Effort: Pulmonary effort is normal.      Breath sounds: Normal breath sounds.      Comments: Hyperventilating - advised slow easy breathing    Abdominal:      Palpations: Abdomen is soft.   Musculoskeletal:         General: Normal range of motion.   Skin:     General: Skin is warm.      Capillary Refill: Capillary refill takes less than 2 seconds.   Neurological:      Mental Status: He is alert and oriented to person, place, and time.      Deep Tendon Reflexes: Reflexes are normal and symmetric.   Psychiatric:         Mood and Affect: Mood normal.         Behavior: Behavior normal. Behavior is cooperative.         Thought Content: Thought content normal.       zofran odt given in UC today   Pt advised not to drink water that he is currently sipping on - until nausea improves.     Assessment /Associated Orders:      1. Nausea and vomiting, intractability of vomiting not specified, unspecified vomiting type  ondansetron (ZOFRAN ODT) dispertab 4 mg    ondansetron (ZOFRAN ODT) 4 MG TABLET DISPERSIBLE    " SARS-CoV-2 PCR (24 hour In-House): Collect NP swab in VTM   2. Diarrhea, unspecified type  SARS-CoV-2 PCR (24 hour In-House): Collect NP swab in VTM   3. Episodic lightheadedness  SARS-CoV-2 PCR (24 hour In-House): Collect NP swab in VTM         Medical Decision Making:    Pt is clinically stable at today's acute urgent care visit.  No acute distress noted. Appropriate for outpatient management at this time.   Acute problem today with uncertain prognosis.   COVID - pcr pending   Quarantine per CDC guidelines  Light diet  Keep well hydrated - sips of fluids   Do not drive if feeling bad or lightheaded     Advised to follow-up with the primary care provider for recheck, reevaluation, and consideration of further management if necessary.   Discussed management options (risks,benefits, and alternatives to treatment). Expressed understanding and the treatment plan was agreed upon. Questions were encouraged and answered   Return to urgent care prn if new or worsening sx or if there is no improvement in condition prn.    Educated in Red flags and indications to immediately call 911 or present to the Emergency Department.     I personally reviewed prior external notes and test results pertinent to today's visit.  I have independently reviewed and interpreted all diagnostics ordered during this urgent care acute visit.   Time spent evaluating this patient was at least 30 minutes and includes preparing for visit, counseling/education, exam and evaluation, obtaining history, independent interpretation, ordering lab/test/procedures,medication management and documentation.Time does not include separately billable procedures noted .

## 2022-02-01 DIAGNOSIS — R42 EPISODIC LIGHTHEADEDNESS: ICD-10-CM

## 2022-02-01 DIAGNOSIS — R11.2 NAUSEA AND VOMITING, INTRACTABILITY OF VOMITING NOT SPECIFIED, UNSPECIFIED VOMITING TYPE: ICD-10-CM

## 2022-02-01 DIAGNOSIS — R19.7 DIARRHEA, UNSPECIFIED TYPE: ICD-10-CM

## 2022-02-01 LAB
COVID ORDER STATUS COVID19: NORMAL
SARS-COV-2 RNA RESP QL NAA+PROBE: NOTDETECTED
SPECIMEN SOURCE: NORMAL

## 2022-04-07 ENCOUNTER — OFFICE VISIT (OUTPATIENT)
Dept: SLEEP MEDICINE | Facility: MEDICAL CENTER | Age: 22
End: 2022-04-07
Payer: COMMERCIAL

## 2022-04-07 VITALS
WEIGHT: 137 LBS | DIASTOLIC BLOOD PRESSURE: 74 MMHG | HEIGHT: 63 IN | HEART RATE: 66 BPM | BODY MASS INDEX: 24.27 KG/M2 | RESPIRATION RATE: 16 BRPM | OXYGEN SATURATION: 95 % | SYSTOLIC BLOOD PRESSURE: 122 MMHG

## 2022-04-07 DIAGNOSIS — G47.33 OSA (OBSTRUCTIVE SLEEP APNEA): ICD-10-CM

## 2022-04-07 PROCEDURE — 99213 OFFICE O/P EST LOW 20 MIN: CPT | Performed by: FAMILY MEDICINE

## 2022-04-07 ASSESSMENT — PATIENT HEALTH QUESTIONNAIRE - PHQ9
CLINICAL INTERPRETATION OF PHQ2 SCORE: 1
SUM OF ALL RESPONSES TO PHQ QUESTIONS 1-9: 5
5. POOR APPETITE OR OVEREATING: 0 - NOT AT ALL

## 2022-04-07 ASSESSMENT — FIBROSIS 4 INDEX: FIB4 SCORE: 0.29

## 2022-04-07 NOTE — PROGRESS NOTES
[facility] Follow Up Note     Date: 4/7/2022 / Time: 1:11 PM    Patient ID:   Name:             Jeffry Gómez     YOB: 2000  Age:                 21 y.o.  male   MRN:               [id number]      Thank you for requesting a sleep medicine consultation on Jeffry Gómez at the sleep center. He presents today with the chief complaints of HANANE follow up.   Night terrors: There were likely due to untreated HANANE.  Since the therapy  HISTORY OF PRESENT ILLNESS:       Pt is currently on CPAP 6-12 cm. He goes to sleep around 9:30-10 pm and wakes up around 5 am. He is getting about 8 hrs of sleep on a good night. Overall, he does finds his sleep refreshing. He denies any symptoms of RLS, narcolepsy or any symptoms to suggest parasomnias such as nightmares, sleep walking or acting out of dreams.He has hx of night terror. He has been having night terror since high school. It happens 3 times a week. He doesn't remember his dream.   However since he has been on the CPAP the incidence has been rare.      He is using CPAP most days of the week. Pt reports 8 hrs of average nightly use of CPAP. Pt denies snoring, gasping,choking.Pt also denies significant mask leak that is interfering with sleep. The 30 day compliance was downloaded which shows adequate compliance with more that 4 hr usage about 100%. The AHI is has improved to 1.2/hr. The mask leak is normal. The symptoms of HANANE are well controlled on the therapy.       SLEEP HISTORY   He had a split-night study on 11/30/2021 which showed mild sleep apnea with AHI of 6.1/h and O2 garcia of 92%. CPAP was tried between 6 and 13 centimeter.  The lowest best tolerated pressure was CPAP 12 cm with improved AHI of 1.01/h.  He was observed in nonsupine REM sleep.  However AHI was well controlled on lower pressures as well however REM was not observed on those settings.      REVIEW OF SYSTEMS:       Constitutional: Denies fevers, Denies weight changes  Eyes:  "Denies changes in vision, no eye pain  Ears/Nose/Throat/Mouth: Denies nasal congestion or sore throat   Cardiovascular: Denies chest pain or palpitations   Respiratory: Denies shortness of breath , Denies cough  Gastrointestinal/Hepatic: Denies abdominal pain, nausea, vomiting, diarrhea, constipation or GI bleeding   Genitourinary: Deniesdysuria or frequency  Musculoskeletal/Rheum: Denies  joint pain and swelling   Skin/Breast: Denies rash,   Neurological: Denies headache, confusion, memory loss or focal weakness/parasthesias  Psychiatric: denies mood disorder   Sleep: denies snoring     Comprehensive review of systems form is reviewed with the patient and is attached in the EMR.     PMH:  has a past medical history of Allergic rhinitis (5/6/2014) and Allergy.    He has no past medical history of Chickenpox or Urdu measles.  MEDS:   Current Outpatient Medications:   •  ondansetron (ZOFRAN ODT) 4 MG TABLET DISPERSIBLE, Take 1 Tablet by mouth every 8 hours as needed., Disp: 20 Tablet, Rfl: 0  •  MELATONIN PO, Take  by mouth., Disp: , Rfl:   ALLERGIES:   Allergies   Allergen Reactions   • Pcn [Penicillins] Rash     SURGHX: No past surgical history on file.  SOCHX:  reports that he has never smoked. He has never used smokeless tobacco. He reports current alcohol use. He reports that he does not use drugs..  FH:   Family History   Problem Relation Age of Onset   • Psychiatric Illness Mother    • Hypertension Mother    • Diabetes Mother    • Psychiatric Illness Father    • No Known Problems Sister    • No Known Problems Brother    • Psychiatric Illness Brother          Physical Exam:  Vitals/ General Appearance:   Weight/BMI: Body mass index is 24.27 kg/m².  /74 (BP Location: Left arm, Patient Position: Sitting, BP Cuff Size: Adult)   Pulse 66   Resp 16   Ht 1.6 m (5' 3\")   Wt 62.1 kg (137 lb)   SpO2 95%   Vitals:    04/07/22 1304   BP: 122/74   BP Location: Left arm   Patient Position: Sitting   BP Cuff " "Size: Adult   Pulse: 66   Resp: 16   SpO2: 95%   Weight: 62.1 kg (137 lb)   Height: 1.6 m (5' 3\")       Pt. is alert and oriented to time, place and person. Cooperative and in no apparent distress.       Constitutional: Alert, no distress, well-groomed.  Skin: No rashes in visible areas.  Eye: Round. Conjunctiva clear, lids normal. No icterus.   ENMT: Lips pink without lesions, good dentition, moist mucous membranes. Phonation normal.  Neck: No masses, no thyromegaly. Moves freely without pain.  CV: Pulse as reported by patient  Respiratory: Unlabored respiratory effort, no cough or audible wheeze  Psych: Alert and oriented x3, normal affect and mood.     ASSESSMENT AND PLAN     1. Sleep Apnea .   The pathophysiology of sleep anea and the increased risk of cardiovascular morbidity from untreated sleep apnea is discussed in detail with the patient. He is urged to avoid supine sleep, weight gain and alcoholic beverages since all of these can worsen sleep apnea. He is cautioned against drowsy driving. If He feels sleepy while driving, He must pull over for a break/nap, rather than persist on the road, in the interest of He own safety and that of others on the road.   Plan   - Continue CPAP 6-12 cm FFM   - Supplies are filled    - compliance download was reviewed and discussed with the pt   - compliance was reinforced     2.  Incidence has been very infrequent.  Patient is happy with the results therefore we will withhold medication.  Reassess as needed.      Regarding treatment of other past medical problems and general health maintenance,  He is urged to follow up with PCP.    "

## 2022-05-12 ENCOUNTER — OFFICE VISIT (OUTPATIENT)
Dept: URGENT CARE | Facility: PHYSICIAN GROUP | Age: 22
End: 2022-05-12
Payer: COMMERCIAL

## 2022-05-12 VITALS
HEIGHT: 63 IN | DIASTOLIC BLOOD PRESSURE: 64 MMHG | HEART RATE: 73 BPM | OXYGEN SATURATION: 97 % | RESPIRATION RATE: 16 BRPM | SYSTOLIC BLOOD PRESSURE: 110 MMHG | BODY MASS INDEX: 24.8 KG/M2 | WEIGHT: 140 LBS | TEMPERATURE: 98 F

## 2022-05-12 DIAGNOSIS — H66.002 NON-RECURRENT ACUTE SUPPURATIVE OTITIS MEDIA OF LEFT EAR WITHOUT SPONTANEOUS RUPTURE OF TYMPANIC MEMBRANE: ICD-10-CM

## 2022-05-12 PROCEDURE — 99213 OFFICE O/P EST LOW 20 MIN: CPT | Performed by: FAMILY MEDICINE

## 2022-05-12 RX ORDER — AZITHROMYCIN 250 MG/1
TABLET, FILM COATED ORAL
Qty: 6 TABLET | Refills: 0 | Status: SHIPPED | OUTPATIENT
Start: 2022-05-12 | End: 2022-10-18

## 2022-05-12 ASSESSMENT — ENCOUNTER SYMPTOMS
FEVER: 0
COUGH: 1
VOMITING: 0

## 2022-05-12 ASSESSMENT — FIBROSIS 4 INDEX: FIB4 SCORE: 0.29

## 2022-05-12 NOTE — PROGRESS NOTES
"Subjective:     Jeffry Gómez is a 21 y.o. male who presents for Otalgia (Left ear pain ), Nasal Congestion (X 1 week ), and Cough    HPI  Pt presents for evaluation of an acute problem  Pt with an acute illness the past 4 days   Having cough, nasal congestion, and left ear pain  Ear pain is constant and worsening  No fevers   Has sick contacts with similar symptoms     Review of Systems   Constitutional: Negative for fever.   HENT: Positive for congestion and ear pain.    Respiratory: Positive for cough.    Gastrointestinal: Negative for vomiting.   Skin: Negative for rash.     PMH:  has a past medical history of Allergic rhinitis (5/6/2014) and Allergy.    He has no past medical history of Chickenpox or Tajik measles.  MEDS:   Current Outpatient Medications:   •  ondansetron (ZOFRAN ODT) 4 MG TABLET DISPERSIBLE, Take 1 Tablet by mouth every 8 hours as needed., Disp: 20 Tablet, Rfl: 0  •  MELATONIN PO, Take  by mouth., Disp: , Rfl:   ALLERGIES:   Allergies   Allergen Reactions   • Pcn [Penicillins] Rash     SURGHX: No past surgical history on file.  SOCHX:  reports that he has never smoked. He has never used smokeless tobacco. He reports current alcohol use. He reports that he does not use drugs.     Objective:   /64   Pulse 73   Temp 36.7 °C (98 °F) (Temporal)   Resp 16   Ht 1.6 m (5' 3\")   Wt 63.5 kg (140 lb)   SpO2 97%   BMI 24.80 kg/m²     Physical Exam  Constitutional:       General: He is not in acute distress.     Appearance: He is well-developed. He is not diaphoretic.   HENT:      Head: Normocephalic and atraumatic.      Right Ear: Tympanic membrane, ear canal and external ear normal.      Left Ear: Ear canal and external ear normal.      Ears:      Comments: Left TM erythematous with moderate purulent effusion present, no perforation appreciated      Nose: Nose normal.      Mouth/Throat:      Mouth: Mucous membranes are moist.      Pharynx: Oropharynx is clear. No oropharyngeal exudate or " posterior oropharyngeal erythema.   Neck:      Trachea: No tracheal deviation.   Cardiovascular:      Rate and Rhythm: Normal rate and regular rhythm.      Heart sounds: Normal heart sounds.   Pulmonary:      Effort: Pulmonary effort is normal. No respiratory distress.      Breath sounds: Normal breath sounds. No wheezing or rales.   Musculoskeletal:         General: Normal range of motion.      Cervical back: Normal range of motion and neck supple. No tenderness.   Lymphadenopathy:      Cervical: No cervical adenopathy.   Skin:     General: Skin is warm and dry.      Findings: No rash.   Neurological:      Mental Status: He is alert.   Psychiatric:         Behavior: Behavior normal.         Thought Content: Thought content normal.         Judgment: Judgment normal.       Assessment/Plan:   Assessment    1. Non-recurrent acute suppurative otitis media of left ear without spontaneous rupture of tympanic membrane  - azithromycin (ZITHROMAX) 250 MG Tab; Take 2 tabs PO first day, then take 1 tab PO daily x 4 days  Dispense: 6 Tablet; Refill: 0    Patient with left-sided otitis media.  Treat with azithromycin due to penicillin allergy.  Follow-up as needed.

## 2022-07-30 ENCOUNTER — PATIENT MESSAGE (OUTPATIENT)
Dept: SLEEP MEDICINE | Facility: MEDICAL CENTER | Age: 22
End: 2022-07-30
Payer: COMMERCIAL

## 2022-08-02 NOTE — PATIENT COMMUNICATION
Patient is not wireless. Would you like me to pend order or have patient bring in device for download

## 2022-08-04 ENCOUNTER — TELEPHONE (OUTPATIENT)
Dept: SLEEP MEDICINE | Facility: MEDICAL CENTER | Age: 22
End: 2022-08-04
Payer: COMMERCIAL

## 2022-08-04 DIAGNOSIS — G47.33 OSA (OBSTRUCTIVE SLEEP APNEA): ICD-10-CM

## 2022-08-04 NOTE — TELEPHONE ENCOUNTER
Pt came into the clinic and spoke to janeth and informed her that he was hoping to have his pressure increased on his cpap machine and would also like to change his mask to resmed F20 instead to an F30i. Pt is not wireless, please advise.

## 2022-08-11 NOTE — TELEPHONE ENCOUNTER
Pt LVM regarding a pressure change due to him having a hard time using the machine with his current mask and wanting a new order for a new for an F30I.    I called pt to him that currently I am waiting on receiving a compliance report from [facility] or receive wireless access to his account for a compliance report. Unfortunately I still have not receive one from [facility]. Called [facility] in [place] and spoke to pepe who informed me that he will have someone contact me regarding this, provided by direct number.

## 2022-10-18 ENCOUNTER — OFFICE VISIT (OUTPATIENT)
Dept: URGENT CARE | Facility: PHYSICIAN GROUP | Age: 22
End: 2022-10-18
Payer: COMMERCIAL

## 2022-10-18 VITALS
TEMPERATURE: 98.7 F | WEIGHT: 145 LBS | DIASTOLIC BLOOD PRESSURE: 60 MMHG | SYSTOLIC BLOOD PRESSURE: 110 MMHG | HEIGHT: 63 IN | RESPIRATION RATE: 12 BRPM | HEART RATE: 72 BPM | BODY MASS INDEX: 25.69 KG/M2 | OXYGEN SATURATION: 98 %

## 2022-10-18 DIAGNOSIS — H92.02 OTALGIA, LEFT: ICD-10-CM

## 2022-10-18 DIAGNOSIS — Z91.09 ENVIRONMENTAL ALLERGIES: ICD-10-CM

## 2022-10-18 DIAGNOSIS — R06.02 SOB (SHORTNESS OF BREATH): ICD-10-CM

## 2022-10-18 PROCEDURE — 99213 OFFICE O/P EST LOW 20 MIN: CPT

## 2022-10-18 RX ORDER — FLUTICASONE PROPIONATE 50 MCG
1 SPRAY, SUSPENSION (ML) NASAL DAILY
Qty: 16 G | Refills: 0 | Status: SHIPPED | OUTPATIENT
Start: 2022-10-18

## 2022-10-18 RX ORDER — ALBUTEROL SULFATE 90 UG/1
1-2 AEROSOL, METERED RESPIRATORY (INHALATION) EVERY 6 HOURS PRN
Qty: 8.5 G | Refills: 0 | Status: SHIPPED | OUTPATIENT
Start: 2022-10-18 | End: 2023-11-25

## 2022-10-18 ASSESSMENT — ENCOUNTER SYMPTOMS
CHILLS: 1
WHEEZING: 0
SORE THROAT: 0
COUGH: 0
RHINORRHEA: 1
FEVER: 0
SYNCOPE: 0
ORTHOPNEA: 0
SHORTNESS OF BREATH: 1

## 2022-10-18 ASSESSMENT — FIBROSIS 4 INDEX: FIB4 SCORE: 0.29

## 2022-10-18 NOTE — PROGRESS NOTES
Subjective     Jeffry Gómez is a 21 y.o. male who presents with Ear Fullness (R side x2days ) and Shortness of Breath ([id number] )            Shortness of Breath  This is a chronic problem. The current episode started more than 1 month ago (4 months). The problem occurs daily. The problem has been unchanged. Associated symptoms include ear pain and rhinorrhea. Pertinent negatives include no fever, leg swelling, orthopnea, sore throat, syncope or wheezing. The symptoms are aggravated by animal exposure and exercise. The patient has no known risk factors for DVT/PE. He has tried rest for the symptoms. His past medical history is significant for allergies. There is no history of asthma.       Patient presents with symptoms started 2 days ago.  He reports fullness/congestion in the right ear, with accompanying itching.  He denies any ear discharge, decreased hearing, tinnitus.  He further reports rhinorrhea and nasal congestion.  He denies any cough or sore throat.  Patient has environmental allergies, currently taking Allegra intermittently.  He also uses saline nasal spray.  Patient has HANANE, states this is since he was 15 years old.  He is currently using CPAP, reports compliance with this.    Patient's current problem list, medications, and past medical/surgical history were reviewed in Epic.    PMH:  has a past medical history of Allergic rhinitis (5/6/2014) and Allergy.    He has no past medical history of Chickenpox or [place] measles.  MEDS: No current outpatient medications on file.  ALLERGIES:   Allergies   Allergen Reactions   • Pcn [Penicillins] Rash     SURGHX: No past surgical history on file.  SOCHX:  reports that he has never smoked. He has never used smokeless tobacco. He reports current alcohol use. He reports that he does not use drugs.  FH: Reviewed with patient, not pertinent to this visit.       Review of Systems   Constitutional:  Positive for chills. Negative for fever.   HENT:  Positive for  "congestion, ear pain and rhinorrhea. Negative for ear discharge and sore throat.         Rhinorrhea   Respiratory:  Positive for shortness of breath. Negative for cough and wheezing.    Cardiovascular:  Negative for orthopnea, leg swelling and syncope.            Objective     /60   Pulse 72   Temp 37.1 °C (98.7 °F) (Temporal)   Resp 12   Ht 1.6 m (5' 3\")   Wt 65.8 kg (145 lb)   SpO2 98%   BMI 25.69 kg/m²      Physical Exam  Constitutional:       Appearance: Normal appearance.   HENT:      Head: Normocephalic.      Right Ear: Tympanic membrane, ear canal and external ear normal.      Left Ear: Ear canal and external ear normal. A middle ear effusion is present.      Nose: Nose normal.   Eyes:      Extraocular Movements: Extraocular movements intact.   Cardiovascular:      Rate and Rhythm: Normal rate and regular rhythm.      Pulses: Normal pulses.      Heart sounds: Normal heart sounds.   Pulmonary:      Effort: Pulmonary effort is normal.      Breath sounds: Normal breath sounds.   Musculoskeletal:         General: Normal range of motion.      Cervical back: Normal range of motion.   Skin:     General: Skin is warm.   Neurological:      General: No focal deficit present.      Mental Status: He is alert.   Psychiatric:         Mood and Affect: Mood normal.         Behavior: Behavior normal.                   Assessment & Plan     1. SOB (shortness of breath)    - albuterol 108 (90 Base) MCG/ACT Aero Soln inhalation aerosol; Inhale 1-2 Puffs every 6 hours as needed for Shortness of Breath.  Dispense: 8.5 g; Refill: 0    2. Otalgia, left      3. Environmental allergies    - fluticasone (FLONASE) 50 MCG/ACT nasal spray; Administer 1 Spray into affected nostril(S) every day.  Dispense: 16 g; Refill: 0     Patient's physical exam is unremarkable.  His vitals are within normal limits, auscultation.  Discussed differentials for shortness of breath, including but not limited to stress, environmental allergies, " asthma, among others.  Patient was advised to take OTC antihistamine daily and use Flonase nasal spray daily for better control of her environmental allergies.  May use albuterol inhaler as needed for shortness of breath.  Patient is instructed to follow-up with pulmonary medicine.    Differential diagnoses, supportive care, and indications for immediate follow-up discussed with patient.   Pathogenesis of diagnosis discussed including typical length and natural progression.   Instructed to return to clinic or nearest emergency department for any change in condition, further concerns, or worsening of symptoms.    Electronically Signed by NOEMÍ Yee

## 2023-09-15 ENCOUNTER — OFFICE VISIT (OUTPATIENT)
Dept: SLEEP MEDICINE | Facility: MEDICAL CENTER | Age: 23
End: 2023-09-15
Attending: STUDENT IN AN ORGANIZED HEALTH CARE EDUCATION/TRAINING PROGRAM
Payer: COMMERCIAL

## 2023-09-15 VITALS
HEIGHT: 63 IN | DIASTOLIC BLOOD PRESSURE: 72 MMHG | RESPIRATION RATE: 16 BRPM | OXYGEN SATURATION: 99 % | SYSTOLIC BLOOD PRESSURE: 114 MMHG | BODY MASS INDEX: 25.52 KG/M2 | HEART RATE: 74 BPM | WEIGHT: 144 LBS

## 2023-09-15 DIAGNOSIS — G47.33 OSA (OBSTRUCTIVE SLEEP APNEA): Primary | ICD-10-CM

## 2023-09-15 PROCEDURE — 3078F DIAST BP <80 MM HG: CPT | Performed by: STUDENT IN AN ORGANIZED HEALTH CARE EDUCATION/TRAINING PROGRAM

## 2023-09-15 PROCEDURE — 99213 OFFICE O/P EST LOW 20 MIN: CPT | Performed by: STUDENT IN AN ORGANIZED HEALTH CARE EDUCATION/TRAINING PROGRAM

## 2023-09-15 PROCEDURE — 3074F SYST BP LT 130 MM HG: CPT | Performed by: STUDENT IN AN ORGANIZED HEALTH CARE EDUCATION/TRAINING PROGRAM

## 2023-09-15 ASSESSMENT — PATIENT HEALTH QUESTIONNAIRE - PHQ9: CLINICAL INTERPRETATION OF PHQ2 SCORE: 0

## 2023-09-15 ASSESSMENT — FIBROSIS 4 INDEX: FIB4 SCORE: 0.3

## 2023-09-15 NOTE — PROGRESS NOTES
[facility] Follow-up Visit    CC: Yearly follow-up for management of obstructive sleep apnea      HPI:  Jeffry Gómez is a 22 y.o.male  with allergic rhinitis, and obstructive sleep apnea on CPAP.  Presents today to follow-up regarding management of obstructive sleep apnea.    He continues to use his CPAP machine nightly.  He finds with using his CPAP machine he does feel his memory is better during the day.  Overall he does feel that he benefits from using CPAP.  Finds his mask and pressures comfortable.  He is receiving supplies regularly from Chanelle Vasquez.    [place] Sleepiness Scale at today's visit 6    He states one of his questions is around fatigue.  He feels that there are times where if he is in a cool calm place he can easily fall asleep.  He feels as though his energy level is not to the level it should be.  He states he does have trouble breathing at times.  He feels he does not get a full breath.  Denies any wheezing or significant shortness of breath that impacts his ability to function.    He has tried albuterol in the past around a significant allergen exposure.    DME provider: Chanelle Vasquez   Device: Airsense 11  Mask: fullface  Aerophagia: No   Snoring: No   Dry mouth: No   Leak: No  sometimes   Skin irritation: No   Chin strap: No     Sleep History  11/30/2021 PSG split-night study showed mild obstructive sleep apnea overall AHI of 6.07, minimum oxygen saturation 92% patient responded well to CPAP therapy    Patient Active Problem List    Diagnosis Date Noted    Disordered sleep 09/17/2021    Depression 01/27/2020    Night terrors, adult 01/27/2020    Allergic rhinitis 05/06/2014    Eczema 03/06/2012       Past Medical History:   Diagnosis Date    Allergic rhinitis 5/6/2014    Allergy         No past surgical history on file.    Family History   Problem Relation Age of Onset    Psychiatric Illness Mother     Hypertension Mother     Diabetes Mother     Psychiatric Illness Father     No  Known Problems Sister     No Known Problems Brother     Psychiatric Illness Brother        Social History     Socioeconomic History    Marital status: Single     Spouse name: Not on file    Number of children: Not on file    Years of education: Not on file    Highest education level: Not on file   Occupational History    Not on file   Tobacco Use    Smoking status: Never    Smokeless tobacco: Never   Vaping Use    Vaping Use: Never used   Substance and Sexual Activity    Alcohol use: Yes     Comment: occ    Drug use: No    Sexual activity: Yes     Partners: Female     Birth control/protection: Condom   Other Topics Concern    Behavioral problems No    Interpersonal relationships No    Sad or not enjoying activities No    Suicidal thoughts No    Poor school performance No    Reading difficulties No    Speech difficulties No    Writing difficulties No    Inadequate sleep No    Excessive TV viewing No    Excessive video game use No    Inadequate exercise No    Sports related No    Poor diet No    Family concerns for drug/alcohol abuse No    Poor oral hygiene No    Bike safety No    Family concerns vehicle safety No   Social History Narrative    Not on file     Social Determinants of Health     Financial Resource Strain: Not on file   Food Insecurity: Not on file   Transportation Needs: Not on file   Physical Activity: Not on file   Stress: Not on file   Social Connections: Not on file   Intimate Partner Violence: Not on file   Housing Stability: Not on file       Current Outpatient Medications   Medication Sig Dispense Refill    fluticasone (FLONASE) 50 MCG/ACT nasal spray Administer 1 Spray into affected nostril(S) every day. 16 g 0    albuterol 108 (90 Base) MCG/ACT Aero Soln inhalation aerosol Inhale 1-2 Puffs every 6 hours as needed for Shortness of Breath. 8.5 g 0     No current facility-administered medications for this visit.        ALLERGIES: Pcn [penicillins]    ROS  Constitutional: Denies fevers, Denies  "weight changes  Ears/Nose/Throat/Mouth: Denies nasal congestion or sore throat   Cardiovascular: Denies chest pain  Respiratory: Denies shortness of breath, Denies cough  Gastrointestinal/Hepatic: Denies nausea, vomiting  Sleep: see HPI      PHYSICAL EXAM  /72 (BP Location: Left arm, Patient Position: Sitting, BP Cuff Size: Adult)   Pulse 74   Resp 16   Ht 1.6 m (5' 3\")   Wt 65.3 kg (144 lb)   SpO2 99% Comment: room air at rest  BMI 25.51 kg/m²   Appearance: Well-nourished, well-developed, no acute distress  Eyes:  No scleral icterus , EOMI  Musculoskeletal:  Grossly normal; gait and station normal; digits and nails normal  Skin:  No rashes, petechiae, cyanosis  Neurologic: without focal signs; oriented to person, time, place, and purpose; judgement intact      Medical Decision Making   Assessment and Plan  Jeffry Gómez is a 22 y.o.male  with allergic rhinitis, and obstructive sleep apnea on CPAP.  Presents today to follow-up regarding management of obstructive sleep apnea.    The medical record was reviewed.    Obstructive sleep apnea  Compliance data reviewed showing 100% usage > 4hours in last 30  days. Average AHI 0.9 events/hour. Pt continues to use and benefit from machine.      Current Settings auto CPAP 6-12 cmH2O    Patient's fatigue does not have a direct etiology at this time.  He does not meet criteria for excessive daytime sleepiness with his Thomas scale being normal at today's visit.  Reviewed that the differential diagnosis for fatigue can be very broad.  Advised for him to establish with a PCP and do a thorough work-up.  He would also be helpful to have a PCP to discuss his difficulty breathing at times during the day.  Patient may benefit from pulmonary function testing    Patient's machine does not have his name or date of birth time.  [employer] company set up his machine unintentionally for different client of theirs.  Recommended he discuss with his [employer] company about changing his " demographic information on his machine    PLAN:   -Order placed for mask and supplies   -Advised to reach out via MyChart with questions     Has been advised to continue the current CPAP, clean equipment frequently, and get new mask and supplies as allowed by insurance and DME. Recommend an earlier appointment, if significant treatment barriers develop.    Patients with HANANE are at increased risk of cardiovascular disease including coronary artery disease, systemic arterial hypertension, pulmonary arterial hypertension, cardiac arrythmias, and stroke. The patient was advised to avoid driving a motor vehicle when drowsy.    Positive airway pressure will favorably impact many of the adverse conditions and effects provoked by HANANE.    Have advised the patient to follow up with the appropriate healthcare practitioners for all other medical problems and issues.    Return in about 1 year (around 9/15/2024).      Please note portions of this record was created using voice recognition software. I have made every reasonable attempt to correct obvious errors, but I expect that there are errors of grammar and possibly content I did not discover before finalizing the note.

## 2023-11-25 ENCOUNTER — OFFICE VISIT (OUTPATIENT)
Dept: URGENT CARE | Facility: PHYSICIAN GROUP | Age: 23
End: 2023-11-25
Payer: COMMERCIAL

## 2023-11-25 VITALS
HEIGHT: 63 IN | TEMPERATURE: 97.3 F | RESPIRATION RATE: 16 BRPM | WEIGHT: 148 LBS | BODY MASS INDEX: 26.22 KG/M2 | OXYGEN SATURATION: 98 % | DIASTOLIC BLOOD PRESSURE: 68 MMHG | HEART RATE: 89 BPM | SYSTOLIC BLOOD PRESSURE: 112 MMHG

## 2023-11-25 DIAGNOSIS — Z20.822 EXPOSURE TO CONFIRMED CASE OF COVID-19: ICD-10-CM

## 2023-11-25 DIAGNOSIS — K52.9 ACUTE GASTROENTERITIS: ICD-10-CM

## 2023-11-25 LAB
FLUAV RNA SPEC QL NAA+PROBE: NEGATIVE
FLUBV RNA SPEC QL NAA+PROBE: NEGATIVE
RSV RNA SPEC QL NAA+PROBE: NEGATIVE
SARS-COV-2 RNA RESP QL NAA+PROBE: NEGATIVE

## 2023-11-25 PROCEDURE — 3074F SYST BP LT 130 MM HG: CPT | Performed by: FAMILY MEDICINE

## 2023-11-25 PROCEDURE — 99213 OFFICE O/P EST LOW 20 MIN: CPT | Performed by: FAMILY MEDICINE

## 2023-11-25 PROCEDURE — 0241U POCT CEPHEID COV-2, FLU A/B, RSV - PCR: CPT | Performed by: FAMILY MEDICINE

## 2023-11-25 PROCEDURE — 3078F DIAST BP <80 MM HG: CPT | Performed by: FAMILY MEDICINE

## 2023-11-25 RX ORDER — DICYCLOMINE HCL 20 MG
TABLET ORAL
Qty: 15 TABLET | Refills: 0 | Status: CANCELLED | OUTPATIENT
Start: 2023-11-25

## 2023-11-25 RX ORDER — ONDANSETRON 4 MG/1
TABLET, ORALLY DISINTEGRATING ORAL
Qty: 15 TABLET | Refills: 0 | Status: CANCELLED | OUTPATIENT
Start: 2023-11-25

## 2023-11-25 NOTE — LETTER
November 25, 2023         Patient: Jeffry Gómez   YOB: 2000   Date of Visit: 11/25/2023           To Whom it May Concern:    Jeffry Gómez was seen in my clinic on 11/25/2023.     Please excuse from work for 11/27/23 due to medical condition.     If you have any questions or concerns, please don't hesitate to call.        Sincerely,           Dario Lynn M.D.  Electronically Signed

## 2023-11-25 NOTE — PROGRESS NOTES
Chief Complaint:    Chief Complaint   Patient presents with    Nausea/Vomiting/Diarrhea     X 3 days dizziness.  Exposed to covid.        History of Present Illness:    Boss told him of being positive for Covid on 11/20/23, patient started with symptoms on 11/22/23. He has felt malaise, lightheaded, has had mild non-productive cough, nausea, vomiting, and diarrhea. No nasal symptoms or sore throat. Last vomited on 11/22/23. Had mild nausea yesterday, today no nausea. Diarrhea persists - about 4-5 episodes yesterday, about 3 episodes today. Imodium helped GI symptoms some.      Past Medical History:    Past Medical History:   Diagnosis Date    Allergic rhinitis 5/6/2014    Allergy      Past Surgical History:    No past surgical history on file.    Social History:    Social History     Socioeconomic History    Marital status: Single     Spouse name: Not on file    Number of children: Not on file    Years of education: Not on file    Highest education level: Not on file   Occupational History    Not on file   Tobacco Use    Smoking status: Never    Smokeless tobacco: Never   Vaping Use    Vaping Use: Never used   Substance and Sexual Activity    Alcohol use: Yes     Comment: occ    Drug use: No    Sexual activity: Yes     Partners: Female     Birth control/protection: Condom   Other Topics Concern    Behavioral problems No    Interpersonal relationships No    Sad or not enjoying activities No    Suicidal thoughts No    Poor school performance No    Reading difficulties No    Speech difficulties No    Writing difficulties No    Inadequate sleep No    Excessive TV viewing No    Excessive video game use No    Inadequate exercise No    Sports related No    Poor diet No    Family concerns for drug/alcohol abuse No    Poor oral hygiene No    Bike safety No    Family concerns vehicle safety No   Social History Narrative    Not on file     Social Determinants of Health     Financial Resource Strain: Not on file   Food  "Insecurity: Not on file   Transportation Needs: Not on file   Physical Activity: Not on file   Stress: Not on file   Social Connections: Not on file   Intimate Partner Violence: Not on file   Housing Stability: Not on file     Family History:    Family History   Problem Relation Age of Onset    Psychiatric Illness Mother     Hypertension Mother     Diabetes Mother     Psychiatric Illness Father     No Known Problems Sister     No Known Problems Brother     Psychiatric Illness Brother      Medications:    Current Outpatient Medications on File Prior to Visit   Medication Sig Dispense Refill    fluticasone (FLONASE) 50 MCG/ACT nasal spray Administer 1 Spray into affected nostril(S) every day. 16 g 0     No current facility-administered medications on file prior to visit.     Allergies:    Allergies   Allergen Reactions    Pcn [Penicillins] Rash       Vitals:    Vitals:    11/25/23 1357   BP: 112/68   Pulse: 89   Resp: 16   Temp: 36.3 °C (97.3 °F)   TempSrc: Temporal   SpO2: 98%   Weight: 67.1 kg (148 lb)   Height: 1.6 m (5' 3\")       Physical Exam:    Constitutional: Vital signs reviewed. Appears well-developed and well-nourished. No acute distress.   Eyes: Sclera white, conjunctivae clear.   ENT: External ears normal. External auditory canals normal without discharge. TMs translucent and non-bulging. Hearing normal. Lips/teeth are normal. Oral mucosa pink and moist. Posterior pharynx: WNL.  Neck: Neck supple.   Cardiovascular: Regular rate and rhythm. No murmur.  Pulmonary/Chest: Respirations non-labored. Clear to auscultation bilaterally.  Abdomen: Bowel sounds are normal active. Soft, non-distended, and non-tender to palpation.  Musculoskeletal: Normal gait. No muscular atrophy or weakness.  Neurological: Alert and oriented to person, place, and time. Muscle tone normal. Coordination normal.   Skin: No rashes or lesions. Warm, dry, normal turgor.  Psychiatric: Normal mood and affect. Behavior is normal. Judgment " and thought content normal.       Diagnostics:    Cepheid PCR test for Covid, Flu, and RSV is negative.       Assessment / Plan & Medical Decision Makin. Acute gastroenteritis    2. Exposure to confirmed case of COVID-19  - POCT CEPHEID COV-2, FLU A/B, RSV - PCR      Work note given - excuse for 23     Discussed with him DDX, management options, and risks, benefits, and alternatives to treatment plan agreed upon.    Boss told him of being positive for Covid on 23, patient started with symptoms on 23. He has felt malaise, lightheaded, has had mild non-productive cough, nausea, vomiting, and diarrhea. No nasal symptoms or sore throat. Last vomited on 23. Had mild nausea yesterday, today no nausea. Diarrhea persists - about 4-5 episodes yesterday, about 3 episodes today. Imodium helped GI symptoms some.    Cepheid PCR test for Covid, Flu, and RSV is negative.     Recommended treat symptoms with medication(s) as needed, otherwise further observation and see if symptoms will self-resolve as likely viral etiology at this time.    Declines Rx medication for symptomatic treatment.    May use over-the-counter meds for symptoms as needed, such as Imodium.    Discussed expected course of duration, time for improvement, and to seek follow-up in Emergency Room, urgent care, or with PCP if getting worse at any time or not improving within expected time frame.

## 2023-12-30 ENCOUNTER — OFFICE VISIT (OUTPATIENT)
Dept: URGENT CARE | Facility: PHYSICIAN GROUP | Age: 23
End: 2023-12-30
Payer: COMMERCIAL

## 2023-12-30 ENCOUNTER — HOSPITAL ENCOUNTER (OUTPATIENT)
Facility: MEDICAL CENTER | Age: 23
End: 2023-12-30
Payer: COMMERCIAL

## 2023-12-30 VITALS
WEIGHT: 144 LBS | DIASTOLIC BLOOD PRESSURE: 80 MMHG | HEIGHT: 63 IN | SYSTOLIC BLOOD PRESSURE: 124 MMHG | TEMPERATURE: 97.8 F | OXYGEN SATURATION: 97 % | HEART RATE: 71 BPM | BODY MASS INDEX: 25.52 KG/M2 | RESPIRATION RATE: 16 BRPM

## 2023-12-30 DIAGNOSIS — Z11.3 SCREENING EXAMINATION FOR STD (SEXUALLY TRANSMITTED DISEASE): ICD-10-CM

## 2023-12-30 PROCEDURE — 3079F DIAST BP 80-89 MM HG: CPT

## 2023-12-30 PROCEDURE — 87591 N.GONORRHOEAE DNA AMP PROB: CPT

## 2023-12-30 PROCEDURE — 99213 OFFICE O/P EST LOW 20 MIN: CPT

## 2023-12-30 PROCEDURE — 87491 CHLMYD TRACH DNA AMP PROBE: CPT

## 2023-12-30 PROCEDURE — 3074F SYST BP LT 130 MM HG: CPT

## 2023-12-30 ASSESSMENT — ENCOUNTER SYMPTOMS
FEVER: 0
CHILLS: 0
FLANK PAIN: 0
VOMITING: 0
ABDOMINAL PAIN: 0

## 2023-12-30 NOTE — PROGRESS NOTES
"Subjective     Jeffry Gómez is a 23 y.o. male who presents with Sexually Transmitted Diseases (New partner)            HPI    Patient presents today for STD screening.  He denies any symptoms but reports having a new partner.  He reports using a condom vaginally but having had oral sex.  He reports only 1 partner.      Patient's current problem list, medications, and past medical/surgical history were reviewed in Epic.    PMH:  has a past medical history of Allergic rhinitis (5/6/2014) and Allergy.    He has no past medical history of Chickenpox or [place] measles.  MEDS:   Current Outpatient Medications:     fluticasone (FLONASE) 50 MCG/ACT nasal spray, Administer 1 Spray into affected nostril(S) every day., Disp: 16 g, Rfl: 0  ALLERGIES:   Allergies   Allergen Reactions    Pcn [Penicillins] Rash     SURGHX: No past surgical history on file.  SOCHX:  reports that he has never smoked. He has never used smokeless tobacco. He reports current alcohol use. He reports that he does not use drugs.  FH: Reviewed with patient, not pertinent to this visit.       Review of Systems   Constitutional:  Negative for chills and fever.   Gastrointestinal:  Negative for abdominal pain and vomiting.   Genitourinary:  Negative for dysuria, flank pain, frequency, hematuria and urgency.   All other systems reviewed and are negative.             Objective     /80 (BP Location: Left arm, Patient Position: Sitting, BP Cuff Size: Adult)   Pulse 71   Temp 36.6 °C (97.8 °F)   Resp 16   Ht 1.6 m (5' 3\")   Wt 65.3 kg (144 lb)   SpO2 97%   BMI 25.51 kg/m²      Physical Exam  Constitutional:       Appearance: Normal appearance.   HENT:      Head: Normocephalic.      Nose: Nose normal.   Eyes:      Extraocular Movements: Extraocular movements intact.   Cardiovascular:      Rate and Rhythm: Normal rate and regular rhythm.      Pulses: Normal pulses.      Heart sounds: Normal heart sounds.   Pulmonary:      Effort: Pulmonary effort is " normal.      Breath sounds: Normal breath sounds.   Musculoskeletal:         General: Normal range of motion.      Cervical back: Normal range of motion.   Skin:     General: Skin is warm.   Neurological:      General: No focal deficit present.      Mental Status: He is alert.   Psychiatric:         Mood and Affect: Mood normal.         Behavior: Behavior normal.                 Assessment & Plan        1. Screening examination for STD (sexually transmitted disease)    - HIV AG/AB Combo Assay Screening; Future  - T.Pallidum AB KIMBERLY (Screening); Future  - Trichomonas Vaginalis by TMA; Future  - Hepatitis C Virus Antibody; Future  - HEP B Surface Antibody; Future  - Hep B Core AB Total; Future  - Hep B Surface Antigen; Future  - Chlamydia/GC, PCR (Urine); Future          Patient is here for STD screening.  He denies any symptoms.  STD panel ordered.  Will treat patient accordingly once results are available and positive for STD.  Recommended safe sex practices.   Discussed treatment plan with patient, he is agreeable and verbalized understanding.  Educated patient on signs and symptoms watch out for, when to return to the clinic or go to the ER.      Electronically Signed by NOEMÍ Yee

## 2024-01-01 DIAGNOSIS — Z11.3 SCREENING EXAMINATION FOR STD (SEXUALLY TRANSMITTED DISEASE): ICD-10-CM

## 2024-01-03 ENCOUNTER — APPOINTMENT (OUTPATIENT)
Dept: URGENT CARE | Facility: PHYSICIAN GROUP | Age: 24
End: 2024-01-03
Payer: COMMERCIAL

## 2024-01-03 ENCOUNTER — HOSPITAL ENCOUNTER (OUTPATIENT)
Dept: LAB | Facility: MEDICAL CENTER | Age: 24
End: 2024-01-03
Payer: COMMERCIAL

## 2024-01-03 DIAGNOSIS — Z11.3 SCREENING EXAMINATION FOR STD (SEXUALLY TRANSMITTED DISEASE): ICD-10-CM

## 2024-01-03 PROCEDURE — 87340 HEPATITIS B SURFACE AG IA: CPT

## 2024-01-03 PROCEDURE — 36415 COLL VENOUS BLD VENIPUNCTURE: CPT

## 2024-01-03 PROCEDURE — 86780 TREPONEMA PALLIDUM: CPT

## 2024-01-03 PROCEDURE — 87389 HIV-1 AG W/HIV-1&-2 AB AG IA: CPT

## 2024-01-03 PROCEDURE — 86706 HEP B SURFACE ANTIBODY: CPT

## 2024-01-03 PROCEDURE — 86803 HEPATITIS C AB TEST: CPT

## 2024-01-03 PROCEDURE — 86704 HEP B CORE ANTIBODY TOTAL: CPT

## 2024-01-04 LAB
HBV CORE AB SERPL QL IA: NONREACTIVE
HBV SURFACE AB SERPL IA-ACNC: 4.54 MIU/ML (ref 0–10)
HBV SURFACE AG SER QL: NORMAL
HCV AB SER QL: NORMAL
HIV 1+2 AB+HIV1 P24 AG SERPL QL IA: NORMAL
T PALLIDUM AB SER QL IA: NORMAL

## 2024-07-12 ENCOUNTER — HOSPITAL ENCOUNTER (OUTPATIENT)
Dept: LAB | Facility: MEDICAL CENTER | Age: 24
End: 2024-07-12
Attending: FAMILY MEDICINE
Payer: COMMERCIAL

## 2024-07-12 ENCOUNTER — OFFICE VISIT (OUTPATIENT)
Dept: URGENT CARE | Facility: PHYSICIAN GROUP | Age: 24
End: 2024-07-12
Payer: COMMERCIAL

## 2024-07-12 VITALS
HEART RATE: 80 BPM | SYSTOLIC BLOOD PRESSURE: 124 MMHG | WEIGHT: 146 LBS | DIASTOLIC BLOOD PRESSURE: 68 MMHG | RESPIRATION RATE: 16 BRPM | OXYGEN SATURATION: 98 % | TEMPERATURE: 98.1 F | BODY MASS INDEX: 25.87 KG/M2 | HEIGHT: 63 IN

## 2024-07-12 DIAGNOSIS — Z11.3 SCREENING EXAMINATION FOR STI: ICD-10-CM

## 2024-07-12 DIAGNOSIS — L30.9 ECZEMA, UNSPECIFIED TYPE: ICD-10-CM

## 2024-07-12 LAB
HAV IGM SERPL QL IA: NORMAL
HBV CORE IGM SER QL: NORMAL
HBV SURFACE AG SER QL: NORMAL
HCV AB SER QL: NORMAL
HIV 1+2 AB+HIV1 P24 AG SERPL QL IA: NORMAL
T PALLIDUM AB SER QL IA: NORMAL

## 2024-07-12 PROCEDURE — 87591 N.GONORRHOEAE DNA AMP PROB: CPT

## 2024-07-12 PROCEDURE — 80074 ACUTE HEPATITIS PANEL: CPT

## 2024-07-12 PROCEDURE — 87389 HIV-1 AG W/HIV-1&-2 AB AG IA: CPT

## 2024-07-12 PROCEDURE — 36415 COLL VENOUS BLD VENIPUNCTURE: CPT

## 2024-07-12 PROCEDURE — 86780 TREPONEMA PALLIDUM: CPT

## 2024-07-12 PROCEDURE — 3074F SYST BP LT 130 MM HG: CPT | Performed by: FAMILY MEDICINE

## 2024-07-12 PROCEDURE — 3078F DIAST BP <80 MM HG: CPT | Performed by: FAMILY MEDICINE

## 2024-07-12 PROCEDURE — 87491 CHLMYD TRACH DNA AMP PROBE: CPT

## 2024-07-12 PROCEDURE — 99213 OFFICE O/P EST LOW 20 MIN: CPT | Performed by: FAMILY MEDICINE

## 2024-07-12 RX ORDER — TRIAMCINOLONE ACETONIDE 1 MG/G
1 OINTMENT TOPICAL 2 TIMES DAILY
Qty: 15 G | Refills: 0 | Status: SHIPPED | OUTPATIENT
Start: 2024-07-12 | End: 2024-07-19

## 2024-09-09 ENCOUNTER — OFFICE VISIT (OUTPATIENT)
Dept: MEDICAL GROUP | Facility: PHYSICIAN GROUP | Age: 24
End: 2024-09-09
Payer: COMMERCIAL

## 2024-09-09 ENCOUNTER — HOSPITAL ENCOUNTER (OUTPATIENT)
Facility: MEDICAL CENTER | Age: 24
End: 2024-09-09
Attending: FAMILY MEDICINE
Payer: COMMERCIAL

## 2024-09-09 ENCOUNTER — HOSPITAL ENCOUNTER (OUTPATIENT)
Dept: LAB | Facility: MEDICAL CENTER | Age: 24
End: 2024-09-09
Attending: FAMILY MEDICINE
Payer: COMMERCIAL

## 2024-09-09 VITALS
HEIGHT: 63 IN | OXYGEN SATURATION: 97 % | TEMPERATURE: 97.6 F | BODY MASS INDEX: 25.48 KG/M2 | SYSTOLIC BLOOD PRESSURE: 90 MMHG | HEART RATE: 60 BPM | DIASTOLIC BLOOD PRESSURE: 50 MMHG | WEIGHT: 143.8 LBS

## 2024-09-09 DIAGNOSIS — Z00.00 HEALTHCARE MAINTENANCE: ICD-10-CM

## 2024-09-09 DIAGNOSIS — E55.9 VITAMIN D DEFICIENCY: ICD-10-CM

## 2024-09-09 DIAGNOSIS — R31.29 OTHER MICROSCOPIC HEMATURIA: ICD-10-CM

## 2024-09-09 DIAGNOSIS — G47.33 OSA (OBSTRUCTIVE SLEEP APNEA): ICD-10-CM

## 2024-09-09 DIAGNOSIS — R35.89 POLYURIA: ICD-10-CM

## 2024-09-09 DIAGNOSIS — F32.A DEPRESSION, UNSPECIFIED DEPRESSION TYPE: ICD-10-CM

## 2024-09-09 DIAGNOSIS — R53.83 OTHER FATIGUE: ICD-10-CM

## 2024-09-09 LAB
25(OH)D3 SERPL-MCNC: 24 NG/ML (ref 30–100)
ALBUMIN SERPL BCP-MCNC: 5 G/DL (ref 3.2–4.9)
ALBUMIN/GLOB SERPL: 1.7 G/DL
ALP SERPL-CCNC: 113 U/L (ref 30–99)
ALT SERPL-CCNC: 35 U/L (ref 2–50)
ANION GAP SERPL CALC-SCNC: 12 MMOL/L (ref 7–16)
APPEARANCE UR: CLEAR
AST SERPL-CCNC: 24 U/L (ref 12–45)
BILIRUB SERPL-MCNC: 0.7 MG/DL (ref 0.1–1.5)
BILIRUB UR STRIP-MCNC: NORMAL MG/DL
BUN SERPL-MCNC: 17 MG/DL (ref 8–22)
CALCIUM ALBUM COR SERPL-MCNC: 9.1 MG/DL (ref 8.5–10.5)
CALCIUM SERPL-MCNC: 9.9 MG/DL (ref 8.5–10.5)
CHLORIDE SERPL-SCNC: 100 MMOL/L (ref 96–112)
CHOLEST SERPL-MCNC: 173 MG/DL (ref 100–199)
CO2 SERPL-SCNC: 25 MMOL/L (ref 20–33)
COLOR UR AUTO: NORMAL
CREAT SERPL-MCNC: 0.75 MG/DL (ref 0.5–1.4)
ERYTHROCYTE [DISTWIDTH] IN BLOOD BY AUTOMATED COUNT: 36.4 FL (ref 35.9–50)
EST. AVERAGE GLUCOSE BLD GHB EST-MCNC: 111 MG/DL
FASTING STATUS PATIENT QL REPORTED: NORMAL
GFR SERPLBLD CREATININE-BSD FMLA CKD-EPI: 128 ML/MIN/1.73 M 2
GLOBULIN SER CALC-MCNC: 3 G/DL (ref 1.9–3.5)
GLUCOSE SERPL-MCNC: 96 MG/DL (ref 65–99)
GLUCOSE UR STRIP.AUTO-MCNC: NORMAL MG/DL
HBA1C MFR BLD: 5.5 % (ref 4–5.6)
HCT VFR BLD AUTO: 46.1 % (ref 42–52)
HDLC SERPL-MCNC: 22 MG/DL
HGB BLD-MCNC: 15.8 G/DL (ref 14–18)
KETONES UR STRIP.AUTO-MCNC: NORMAL MG/DL
LDLC SERPL CALC-MCNC: 127 MG/DL
LEUKOCYTE ESTERASE UR QL STRIP.AUTO: NORMAL
MCH RBC QN AUTO: 29.2 PG (ref 27–33)
MCHC RBC AUTO-ENTMCNC: 34.3 G/DL (ref 32.3–36.5)
MCV RBC AUTO: 85.1 FL (ref 81.4–97.8)
NITRITE UR QL STRIP.AUTO: NORMAL
PH UR STRIP.AUTO: 7 [PH] (ref 5–8)
PLATELET # BLD AUTO: 275 K/UL (ref 164–446)
PMV BLD AUTO: 10 FL (ref 9–12.9)
POTASSIUM SERPL-SCNC: 4.2 MMOL/L (ref 3.6–5.5)
PROT SERPL-MCNC: 8 G/DL (ref 6–8.2)
PROT UR QL STRIP: NORMAL MG/DL
RBC # BLD AUTO: 5.42 M/UL (ref 4.7–6.1)
RBC UR QL AUTO: NORMAL
SODIUM SERPL-SCNC: 137 MMOL/L (ref 135–145)
SP GR UR STRIP.AUTO: 1.01
TRIGL SERPL-MCNC: 119 MG/DL (ref 0–149)
TSH SERPL DL<=0.005 MIU/L-ACNC: 1.49 UIU/ML (ref 0.38–5.33)
UROBILINOGEN UR STRIP-MCNC: 1 MG/DL
WBC # BLD AUTO: 4.9 K/UL (ref 4.8–10.8)

## 2024-09-09 PROCEDURE — 80053 COMPREHEN METABOLIC PANEL: CPT

## 2024-09-09 PROCEDURE — 85027 COMPLETE CBC AUTOMATED: CPT

## 2024-09-09 PROCEDURE — 84443 ASSAY THYROID STIM HORMONE: CPT

## 2024-09-09 PROCEDURE — 99204 OFFICE O/P NEW MOD 45 MIN: CPT | Performed by: FAMILY MEDICINE

## 2024-09-09 PROCEDURE — 81002 URINALYSIS NONAUTO W/O SCOPE: CPT | Performed by: FAMILY MEDICINE

## 2024-09-09 PROCEDURE — 82306 VITAMIN D 25 HYDROXY: CPT

## 2024-09-09 PROCEDURE — 36415 COLL VENOUS BLD VENIPUNCTURE: CPT

## 2024-09-09 PROCEDURE — 87086 URINE CULTURE/COLONY COUNT: CPT

## 2024-09-09 PROCEDURE — 83036 HEMOGLOBIN GLYCOSYLATED A1C: CPT

## 2024-09-09 PROCEDURE — 80061 LIPID PANEL: CPT

## 2024-09-09 PROCEDURE — 3074F SYST BP LT 130 MM HG: CPT | Performed by: FAMILY MEDICINE

## 2024-09-09 PROCEDURE — 3078F DIAST BP <80 MM HG: CPT | Performed by: FAMILY MEDICINE

## 2024-09-09 ASSESSMENT — PATIENT HEALTH QUESTIONNAIRE - PHQ9
CLINICAL INTERPRETATION OF PHQ2 SCORE: 1
SUM OF ALL RESPONSES TO PHQ QUESTIONS 1-9: 7
5. POOR APPETITE OR OVEREATING: 0 - NOT AT ALL

## 2024-09-09 NOTE — PROGRESS NOTES
Verbal consent was acquired by the patient to use Identified ambient listening note generation during this visit.    Subjective:     HPI:   History of Present Illness  The patient presents for the establishment of care.    He reports persistent fatigue, which he attributes to his sleep apnea. Despite regular use of a CPAP machine, he experiences difficulty in maintaining daily functions. He has undergone a sleep study and noticed an improvement in symptoms after starting CPAP therapy two years ago. He uses the CPAP machine nightly. He occasionally struggles with insomnia and consistently feels tired or low on energy. His appetite remains stable.    He mentions a past diagnosis of depression, which he believes was due to sleep deprivation. Currently, he reports no depressive symptoms. He admits to occasional concentration issues and slow speech. He does not endorse any feelings of self-deprecation or failure, nor does he have any suicidal ideation or self-harm thoughts.    He has a history of vitamin D deficiency but is not currently taking supplements. He also reports frequent urination, up to 6 times per hour during the day and waking up 3 to 4 times at night. He recently tested negative for STDs. He is not experiencing dizziness or lightheadedness.    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 He occasionally experiences random pains on his sides, unrelated to any injury, but is not currently in pain.    He last visited an eye doctor some time ago and has an upcoming appointment in October 2024. He recently saw a dentist and maintains regular dental check-ups every 6 months. He has a history of fainting episodes.    SOCIAL HISTORY  He smokes hemp once in a while. He denies using tobacco or nicotine. He denies any drug use since he was in college.    FAMILY HISTORY  His father and grandfather had diabetes. His father was an adult when he was  "diagnosed with diabetes.    Health Maintenance: Completed    Objective:     Exam:  BP 90/50 (BP Location: Left arm, Patient Position: Sitting, BP Cuff Size: Adult)   Pulse 60   Temp 36.4 °C (97.6 °F) (Temporal)   Ht 1.6 m (5' 3\")   Wt 65.2 kg (143 lb 12.8 oz)   SpO2 97%   BMI 25.47 kg/m²  Body mass index is 25.47 kg/m².    Physical Exam  Vitals reviewed. Exam conducted with a chaperone present.   Constitutional:       Appearance: Normal appearance.   HENT:      Head: Normocephalic and atraumatic.      Nose: Nose normal.   Cardiovascular:      Rate and Rhythm: Normal rate and regular rhythm.      Pulses: Normal pulses.      Heart sounds: Normal heart sounds. No murmur heard.  Pulmonary:      Effort: Pulmonary effort is normal.      Breath sounds: Normal breath sounds.   Abdominal:      General: Abdomen is flat.      Palpations: Abdomen is soft.   Skin:     General: Skin is warm and dry.   Neurological:      Mental Status: He is alert.   Psychiatric:         Mood and Affect: Mood normal.         Behavior: Behavior normal.             Results      Assessment & Plan:     1. Healthcare maintenance  CBC WITHOUT DIFFERENTIAL    TSH WITH REFLEX TO FT4    HEMOGLOBIN A1C    Lipid Profile    Comp Metabolic Panel    VITAMIN D,25 HYDROXY (DEFICIENCY)    POCT Urinalysis      2. Other fatigue  CBC WITHOUT DIFFERENTIAL    TSH WITH REFLEX TO FT4    VITAMIN D,25 HYDROXY (DEFICIENCY)      3. Depression, unspecified depression type  TSH WITH REFLEX TO FT4    VITAMIN D,25 HYDROXY (DEFICIENCY)      4. HANANE (obstructive sleep apnea)        5. Vitamin D deficiency  VITAMIN D,25 HYDROXY (DEFICIENCY)      6. Polyuria  POCT Urinalysis          Assessment & Plan  1. Fatigue.  The fatigue may be due to potential thyroid disorder, vitamin D deficiency, or elevated blood sugar levels. Depression or uncontrolled sleep apnea could also be contributing. Initial lab work will be ordered to rule out these conditions. Labs will include vitamin D, " cholesterol, electrolytes, kidney and liver function, CBC, and thyroid tests. A urine test will be conducted today to check for high sugar levels due to polyuria. A titration study may be considered in the future to ensure optimal CPAP settings.    2. Sleep Apnea.  He was diagnosed with sleep apnea two years ago and uses a CPAP every night. Despite using the CPAP, he continues to experience fatigue. A titration study may be considered in the future to ensure the CPAP settings are optimal.    3. Depression.  He reports occasional feelings of being down due to the state of the economy but does not currently have significant symptoms of depression. No immediate need for medication or therapy, but these options can be considered if symptoms worsen.  PHQ 9 score of 7    4.  Polyuria  He reports frequent urination, up to six times an hour and waking up three to four times at night. This could be an indicator of high blood sugar. A urine test will be conducted today to check for glucose levels. If necessary, further tests will be ordered.    5. Health Maintenance.  He is advised to receive his influenza and COVID-19 booster vaccines next month. He is up to date on dental and eye exams, with an eye appointment scheduled in October.        Return in about 1 year (around 9/9/2025) for Annual/wellness visit.    Please note that this dictation was created using voice recognition software. I have made every reasonable attempt to correct obvious errors, but I expect that there are errors of grammar and possibly content that I did not discover before finalizing the note.    Valerie Samayoa MD  Family Medicine and Non - Operative Sports Medicine   [facility] [place]

## 2024-09-11 LAB
BACTERIA UR CULT: NORMAL
SIGNIFICANT IND 70042: NORMAL
SITE SITE: NORMAL
SOURCE SOURCE: NORMAL

## 2024-10-29 DIAGNOSIS — Z00.6 CLINICAL TRIAL PARTICIPANT: ICD-10-CM

## 2024-11-04 ENCOUNTER — APPOINTMENT (OUTPATIENT)
Dept: MEDICAL GROUP | Facility: PHYSICIAN GROUP | Age: 24
End: 2024-11-04
Payer: COMMERCIAL

## 2024-11-04 VITALS
HEIGHT: 63 IN | DIASTOLIC BLOOD PRESSURE: 68 MMHG | SYSTOLIC BLOOD PRESSURE: 116 MMHG | BODY MASS INDEX: 26.08 KG/M2 | TEMPERATURE: 98.1 F | HEART RATE: 81 BPM | OXYGEN SATURATION: 96 % | RESPIRATION RATE: 14 BRPM | WEIGHT: 147.2 LBS

## 2024-11-04 DIAGNOSIS — R31.9 HEMATURIA, UNSPECIFIED TYPE: ICD-10-CM

## 2024-11-04 DIAGNOSIS — R33.9 INCOMPLETE BLADDER EMPTYING: ICD-10-CM

## 2024-11-04 DIAGNOSIS — F32.A DEPRESSION, UNSPECIFIED DEPRESSION TYPE: ICD-10-CM

## 2024-11-04 DIAGNOSIS — R45.850 HOMICIDAL IDEATION: ICD-10-CM

## 2024-11-04 DIAGNOSIS — F41.9 ANXIETY: ICD-10-CM

## 2024-11-04 LAB
APPEARANCE UR: CLEAR
BILIRUB UR STRIP-MCNC: NORMAL MG/DL
COLOR UR AUTO: YELLOW
GLUCOSE UR STRIP.AUTO-MCNC: NORMAL MG/DL
KETONES UR STRIP.AUTO-MCNC: NORMAL MG/DL
LEUKOCYTE ESTERASE UR QL STRIP.AUTO: NORMAL
NITRITE UR QL STRIP.AUTO: NORMAL
PH UR STRIP.AUTO: 7 [PH] (ref 5–8)
PROT UR QL STRIP: NORMAL MG/DL
RBC UR QL AUTO: NORMAL
SP GR UR STRIP.AUTO: 1.01
UROBILINOGEN UR STRIP-MCNC: 0.2 MG/DL

## 2024-11-04 PROCEDURE — 3078F DIAST BP <80 MM HG: CPT | Performed by: FAMILY MEDICINE

## 2024-11-04 PROCEDURE — 99214 OFFICE O/P EST MOD 30 MIN: CPT | Performed by: FAMILY MEDICINE

## 2024-11-04 PROCEDURE — 3074F SYST BP LT 130 MM HG: CPT | Performed by: FAMILY MEDICINE

## 2024-11-04 PROCEDURE — 81002 URINALYSIS NONAUTO W/O SCOPE: CPT | Performed by: FAMILY MEDICINE

## 2024-11-04 RX ORDER — ESCITALOPRAM OXALATE 10 MG/1
10 TABLET ORAL DAILY
Qty: 60 TABLET | Refills: 0 | Status: SHIPPED | OUTPATIENT
Start: 2024-11-04

## 2024-11-04 ASSESSMENT — PATIENT HEALTH QUESTIONNAIRE - PHQ9
SUM OF ALL RESPONSES TO PHQ QUESTIONS 1-9: 8
CLINICAL INTERPRETATION OF PHQ2 SCORE: 2
5. POOR APPETITE OR OVEREATING: 0 - NOT AT ALL

## 2024-11-04 ASSESSMENT — ANXIETY QUESTIONNAIRES
5. BEING SO RESTLESS THAT IT IS HARD TO SIT STILL: SEVERAL DAYS
GAD7 TOTAL SCORE: 8
6. BECOMING EASILY ANNOYED OR IRRITABLE: NOT AT ALL
7. FEELING AFRAID AS IF SOMETHING AWFUL MIGHT HAPPEN: SEVERAL DAYS
1. FEELING NERVOUS, ANXIOUS, OR ON EDGE: MORE THAN HALF THE DAYS
2. NOT BEING ABLE TO STOP OR CONTROL WORRYING: SEVERAL DAYS
4. TROUBLE RELAXING: NEARLY EVERY DAY
3. WORRYING TOO MUCH ABOUT DIFFERENT THINGS: NOT AT ALL

## 2024-11-04 ASSESSMENT — FIBROSIS 4 INDEX: FIB4 SCORE: 0.37

## 2024-11-04 NOTE — PROGRESS NOTES
Verbal consent was acquired by the patient to use [employer] ambient listening note generation during this visit.    Subjective:     HPI:   History of Present Illness  The patient is a 23-year-old male presenting today as an established patient for follow-up on microscopic hematuria noted on a previous urinalysis.    He reports no urinary symptoms such as pain, burning, penile discharge or urgency.  He does however report increased frequency and incomplete emptying.  Denies any start stop symptoms but does report some dribbling.  He does not experience fever or chills. However, he feels as though he is not fully emptying his bladder and notes occasional dribbling post-urination. He experiences random abdominal pain, which he attributes to his gym workouts.  He denies any specific suprapubic pain.  He has been taking vitamin D supplements, which he believes have been beneficial for fatigue.    Additionally, he would like to discuss symptoms of continued depression and possible ADHD. He has a history of panic attacks but has not had any recently. He admits to having thoughts of harming others but has no intent or plan to do so. He has not experienced any hypomanic states or stayed awake for extended periods. He expresses a desire to discuss his mental health, noting that his older brother has ADHD and his father, while not diagnosed, exhibits similar symptoms. His partner and boss have observed him to be disassociated, often not listening during conversations. He sometimes gets stuck in thought loops and has difficulty focusing. He is not currently on any medications for behavioral health.  Patient has never been seen by Psychiatry but has had previous referral for similar symptoms.  He occasionally struggles with sleep but has been sleeping well recently. He often feels tired and has trouble concentrating. He has not taken any SSRIs in the past.    FAMILY HISTORY  His older brother had ADHD. His father claims he has  "ADHD, but was never diagnosed.    Health Maintenance: Completed    Objective:     Exam:  /68   Pulse 81   Temp 36.7 °C (98.1 °F) (Temporal)   Resp 14   Ht 1.6 m (5' 3\")   Wt 66.8 kg (147 lb 3.2 oz)   SpO2 96%   BMI 26.08 kg/m²  Body mass index is 26.08 kg/m².    Physical Exam  Vitals reviewed.   Constitutional:       Appearance: Normal appearance.   HENT:      Head: Normocephalic and atraumatic.      Nose: Nose normal.   Cardiovascular:      Rate and Rhythm: Normal rate and regular rhythm.      Pulses: Normal pulses.      Heart sounds: Normal heart sounds. No murmur heard.  Pulmonary:      Effort: Pulmonary effort is normal.      Breath sounds: Normal breath sounds.   Abdominal:      General: Abdomen is flat.      Palpations: Abdomen is soft.   Skin:     General: Skin is warm and dry.   Neurological:      Mental Status: He is alert.   Psychiatric:         Mood and Affect: Mood normal.         Behavior: Behavior normal.             Results      Assessment & Plan:     1. Hematuria, unspecified type  POCT Urinalysis    Referral to Urology      2. Depression, unspecified depression type  Referral to Behavioral Health      3. Incomplete bladder emptying  Referral to Urology      4. Anxiety        5. Homicidal ideation            Assessment & Plan  1. Microscopic hematuria.  A urine analysis will be conducted to ensure the absence of microscopic hematuria. He reports no urinary symptoms such as burning, pain, penile discharge, increased frequency, or urgency. He also reports no fever or chills.    2. Depression.  A referral to behavioral health will be made for psychological testing to rule out ADHD. An SSRI will be initiated at a low dose of 10 mg, with a 2-month supply provided. The dosage will be reassessed after 4 to 6 weeks. He is advised to contact the clinic immediately or visit the emergency room if his condition deteriorates. PHQ 9 2 was 2 and PHQ 9 9 was 8.    3. Anxiety.  A referral to behavioral " health will be made for psychological testing to rule out ADHD. An SSRI will be initiated at a low dose of 10 mg, with a 2-month supply provided. The dosage will be reassessed after 4 to 6 weeks. He is advised to contact the clinic immediately or visit the emergency room if his condition deteriorates. CAMPBELL 7 was 8 in office today.             Return in about 6 weeks (around 12/16/2024) for Depression, PHQ9, Anxiety, GAD7.    Please note that this dictation was created using voice recognition software. I have made every reasonable attempt to correct obvious errors, but I expect that there are errors of grammar and possibly content that I did not discover before finalizing the note.    Valerie Samayoa MD  Family Medicine and Non - Operative Sports Medicine   [facility]

## 2024-11-06 ENCOUNTER — PATIENT MESSAGE (OUTPATIENT)
Dept: MEDICAL GROUP | Facility: PHYSICIAN GROUP | Age: 24
End: 2024-11-06
Payer: COMMERCIAL

## 2024-11-07 ENCOUNTER — TELEPHONE (OUTPATIENT)
Dept: HEALTH INFORMATION MANAGEMENT | Facility: OTHER | Age: 24
End: 2024-11-07
Payer: COMMERCIAL

## 2024-11-07 NOTE — TELEPHONE ENCOUNTER
"3 days of taking new medication Lexapro.   Day one, tolerated medication but had short bouts of anxiety and some nausea.   Day two, taken with food, vomited 30 min later. Did not take another dose.   Day three (today) taken at 0630 w/ small food, nausea followed, lasting  approx 4 hours. Today felt an \"overwhelming feeling\" like too much medication,started approx. 2 hours after. Now during phone call he is feeling pretty good.     Unsure if this is an excessive response to the new medication and how to proceed. He is wiling to continue the medication if it's likely that the adverse side effects will subside.     Encounter sent to PCP.   "

## 2024-11-11 ENCOUNTER — APPOINTMENT (OUTPATIENT)
Dept: LAB | Facility: MEDICAL CENTER | Age: 24
End: 2024-11-11
Attending: FAMILY MEDICINE

## 2024-11-25 ENCOUNTER — OFFICE VISIT (OUTPATIENT)
Dept: UROLOGY | Facility: MEDICAL CENTER | Age: 24
End: 2024-11-25
Payer: COMMERCIAL

## 2024-11-25 DIAGNOSIS — N02.9 IDIOPATHIC HEMATURIA, UNSPECIFIED WHETHER GLOMERULAR MORPHOLOGIC CHANGES PRESENT: ICD-10-CM

## 2024-11-25 DIAGNOSIS — R33.9 INCOMPLETE BLADDER EMPTYING: ICD-10-CM

## 2024-11-25 RX ORDER — TAMSULOSIN HYDROCHLORIDE 0.4 MG/1
0.4 CAPSULE ORAL DAILY
Qty: 30 CAPSULE | Refills: 2 | Status: SHIPPED | OUTPATIENT
Start: 2024-11-25

## 2024-11-25 NOTE — PROGRESS NOTES
Chief Complaint: hematuria    The patient was referred by Valerie Samayoa M.D. for evaluation of the above chief complaint    History of Present Illness:    Jeffry Gómez is a 23 y.o. male who presents today for hematuria and urinary symptoms  - Reports UA with hematuria  - No gross hematuria  - No UTI symptoms  - No STI risk factors  - Never smoker    - Also with urinary symptoms  - Reports double voiding, incomplete emptying  - Reports post-void dribbling  - May be associated with lexapro timing    Subjective    Family History   Problem Relation Age of Onset    Psychiatric Illness Mother     Hypertension Mother     Diabetes Mother     Psychiatric Illness Father     No Known Problems Sister     No Known Problems Brother     Psychiatric Illness Brother      Social History     Socioeconomic History    Marital status: Single     Spouse name: Not on file    Number of children: Not on file    Years of education: Not on file    Highest education level: Not on file   Occupational History    Not on file   Tobacco Use    Smoking status: Never    Smokeless tobacco: Never   Vaping Use    Vaping status: Never Used   Substance and Sexual Activity    Alcohol use: Yes     Comment: occ    Drug use: No    Sexual activity: Yes     Partners: Female     Birth control/protection: Condom   Other Topics Concern    Behavioral problems No    Interpersonal relationships No    Sad or not enjoying activities No    Suicidal thoughts No    Poor school performance No    Reading difficulties No    Speech difficulties No    Writing difficulties No    Inadequate sleep No    Excessive TV viewing No    Excessive video game use No    Inadequate exercise No    Sports related No    Poor diet No    Family concerns for drug/alcohol abuse No    Poor oral hygiene No    Bike safety No    Family concerns vehicle safety No   Social History Narrative    Not on file     Social Drivers of Health     Financial Resource Strain: Not on file   Food Insecurity:  Not on file   Transportation Needs: Not on file   Physical Activity: Not on file   Stress: Not on file   Social Connections: Not on file   Intimate Partner Violence: Not on file   Housing Stability: Not on file     No past surgical history on file.  Past Medical History:   Diagnosis Date    Allergic rhinitis 5/6/2014    Allergy      Current Outpatient Medications   Medication Sig Dispense Refill    escitalopram (LEXAPRO) 10 MG Tab Take 1 Tablet by mouth every day. 60 Tablet 0     No current facility-administered medications for this visit.     Allergies   Allergen Reactions    Pcn [Penicillins] Rash       Review of systems was conducted and was negative except for as explicitly listed in the History of Present Illness.       Objective   There were no vitals taken for this visit.  Physical Exam  Vitals reviewed.   HENT:      Head: Normocephalic.      Nose: Nose normal.      Mouth/Throat:      Pharynx: Oropharynx is clear.   Eyes:      Conjunctiva/sclera: Conjunctivae normal.   Cardiovascular:      Rate and Rhythm: Normal rate.      Pulses: Normal pulses.   Pulmonary:      Effort: Pulmonary effort is normal.   Abdominal:      Palpations: Abdomen is soft.   Musculoskeletal:         General: Normal range of motion.      Cervical back: Neck supple.   Skin:     General: Skin is warm.   Neurological:      Mental Status: He is alert. Mental status is at baseline.   Psychiatric:         Mood and Affect: Mood normal.         Lab/Radiology/Diagnostic Review:  POCT UA   Lab Results   Component Value Date/Time    POCCOLOR yellow 11/04/2024 01:30 PM    POCAPPEAR clear 11/04/2024 01:30 PM    POCLEUKEST neg 11/04/2024 01:30 PM    POCNITRITE neg 11/04/2024 01:30 PM    POCUROBILIGE 0.2 11/04/2024 01:30 PM    POCPROTEIN neg 11/04/2024 01:30 PM    POCURPH 7.0 11/04/2024 01:30 PM    POCBLOOD Trace 11/04/2024 01:30 PM    POCSPGRV 1.015 11/04/2024 01:30 PM    POCKETONES neg 11/04/2024 01:30 PM    POCBILIRUBIN neg 11/04/2024 01:30 PM     POCGLUCUA neg 11/04/2024 01:30 PM      CBC   Lab Results   Component Value Date/Time    WBC 4.9 09/09/2024 0853    RBC 5.42 09/09/2024 0853    HEMOGLOBIN 15.8 09/09/2024 0853    HEMATOCRIT 46.1 09/09/2024 0853    MCV 85.1 09/09/2024 0853    MCH 29.2 09/09/2024 0853    MCHC 34.3 09/09/2024 0853    RDW 36.4 09/09/2024 0853    MPV 10.0 09/09/2024 0853    LYMPHOCYTES 38 10/27/2021 0844    LYMPHS 2.1 10/27/2021 0844    MONOCYTES 6 10/27/2021 0844    MONOS 0.3 10/27/2021 0844    EOSINOPHILS 1 10/27/2021 0844    EOS 0.1 10/27/2021 0844    BASOPHILS 0 10/27/2021 0844    BASO 0.0 10/27/2021 0844    NRBC CANCELED 10/27/2021 0844       BMP   Lab Results   Component Value Date/Time    SODIUM 137 09/09/2024 0853    POTASSIUM 4.2 09/09/2024 0853    CHLORIDE 100 09/09/2024 0853    CO2 25 09/09/2024 0853    GLUCOSE 96 09/09/2024 0853    BUN 17 09/09/2024 0853    CREATININE 0.75 09/09/2024 0853    CALCIUM 9.9 09/09/2024 0853     I have reviewed and independently examined the lab results.  My findings are given in the HPI or above with the associated tests.        Assessment & Plan    It was a pleasure speaking with Jeffry Gómez today.    Jeffry Julián Gómez is a 23 y.o. male w/ hematuria and urinary symptoms  1) Hematuria  - Discussed AUA and NCCN guidelines  - Recommend CT urogram  - Given never smoker if CT urogram is negative, will not recommend cystoscopy at his age  2) urinary symptoms  - May be increased sphincter tone due to lexapro  - May also be prostatitis given some referred testicular pain  - Discussed treatment options including antibiotics and tamsulosin  - Will trial tamsulosin for 1 month to see if this helps resolve his symptoms  - Otherwise, recommend high fluid intake and regular ejaculatory frequency

## 2024-12-09 ENCOUNTER — HOSPITAL ENCOUNTER (OUTPATIENT)
Dept: RADIOLOGY | Facility: MEDICAL CENTER | Age: 24
End: 2024-12-09
Attending: UROLOGY
Payer: COMMERCIAL

## 2024-12-09 ENCOUNTER — OFFICE VISIT (OUTPATIENT)
Dept: MEDICAL GROUP | Facility: PHYSICIAN GROUP | Age: 24
End: 2024-12-09
Payer: COMMERCIAL

## 2024-12-09 VITALS
BODY MASS INDEX: 26.05 KG/M2 | OXYGEN SATURATION: 98 % | HEART RATE: 89 BPM | HEIGHT: 63 IN | TEMPERATURE: 98.1 F | DIASTOLIC BLOOD PRESSURE: 70 MMHG | SYSTOLIC BLOOD PRESSURE: 120 MMHG | WEIGHT: 147 LBS

## 2024-12-09 DIAGNOSIS — F41.9 ANXIETY: ICD-10-CM

## 2024-12-09 DIAGNOSIS — N02.9 IDIOPATHIC HEMATURIA, UNSPECIFIED WHETHER GLOMERULAR MORPHOLOGIC CHANGES PRESENT: ICD-10-CM

## 2024-12-09 DIAGNOSIS — R45.850 HOMICIDAL IDEATION: ICD-10-CM

## 2024-12-09 PROCEDURE — 700117 HCHG RX CONTRAST REV CODE 255: Performed by: UROLOGY

## 2024-12-09 PROCEDURE — 3074F SYST BP LT 130 MM HG: CPT | Performed by: FAMILY MEDICINE

## 2024-12-09 PROCEDURE — 3078F DIAST BP <80 MM HG: CPT | Performed by: FAMILY MEDICINE

## 2024-12-09 PROCEDURE — 74178 CT ABD&PLV WO CNTR FLWD CNTR: CPT

## 2024-12-09 PROCEDURE — 99214 OFFICE O/P EST MOD 30 MIN: CPT | Performed by: FAMILY MEDICINE

## 2024-12-09 RX ORDER — ESCITALOPRAM OXALATE 10 MG/1
20 TABLET ORAL DAILY
Qty: 180 TABLET | Refills: 3 | Status: SHIPPED | OUTPATIENT
Start: 2024-12-09

## 2024-12-09 RX ADMIN — IOHEXOL 100 ML: 350 INJECTION, SOLUTION INTRAVENOUS at 15:35

## 2024-12-09 ASSESSMENT — ANXIETY QUESTIONNAIRES
7. FEELING AFRAID AS IF SOMETHING AWFUL MIGHT HAPPEN: SEVERAL DAYS
4. TROUBLE RELAXING: SEVERAL DAYS
6. BECOMING EASILY ANNOYED OR IRRITABLE: NOT AT ALL
3. WORRYING TOO MUCH ABOUT DIFFERENT THINGS: NOT AT ALL
5. BEING SO RESTLESS THAT IT IS HARD TO SIT STILL: NOT AT ALL
GAD7 TOTAL SCORE: 4
2. NOT BEING ABLE TO STOP OR CONTROL WORRYING: SEVERAL DAYS
1. FEELING NERVOUS, ANXIOUS, OR ON EDGE: SEVERAL DAYS

## 2024-12-09 ASSESSMENT — PATIENT HEALTH QUESTIONNAIRE - PHQ9
5. POOR APPETITE OR OVEREATING: 0 - NOT AT ALL
SUM OF ALL RESPONSES TO PHQ QUESTIONS 1-9: 4
CLINICAL INTERPRETATION OF PHQ2 SCORE: 1

## 2024-12-09 ASSESSMENT — FIBROSIS 4 INDEX: FIB4 SCORE: 0.38

## 2024-12-09 NOTE — PROGRESS NOTES
Verbal consent was acquired by the patient to use [employer] ambient listening note generation during this visit.    Subjective:     HPI:   History of Present Illness  The patient is a 24-year-old male presenting for a follow-up visit as an established patient. His last appointment was on 11/04/2024.    At the previous visit, he was prescribed escitalopram and referred to behavioral health and urology due to hematuria. He experienced significant adverse effects from the medication, including nausea, but has remained adherent to the treatment regimen. Most side effects have resolved, except for a persistent clenching sensation in his jaw and occasional tension headaches. The patient reports that the medication has been beneficial, noting an improvement in his condition and is contemplating a dosage increase. He also reports increased focus and regular gym attendance. He denies any exacerbation of suicidal or homicidal ideation. He experiences vivid dreams and has had one psychotic episode. He has ceased marijuana use and admits to high caffeine intake today. He has a scheduled appointment with a psychiatrist in January 2025.    The patient is currently taking tamsulosin (Flomax), which he believes is causing somnolence. He recalls increased energy and earlier waking times when he initially started escitalopram, but since commencing tamsulosin, he has been experiencing prolonged sleep durations, up to 11 hours. He administers both medications around 8:30 PM. He is to continue tamsulosin for one month. His urologist has suggested a diagnosis of prostatitis, and he is scheduled for an MRI today. The patient also reports an allergy to alcohol.    He inquires whether previous blood tests included allergy screening. He reports that consumption of corn flour causes gastrointestinal discomfort, making it difficult for him to eat. He typically consumes whole wheat or sourdough bread, and deviation from this results in  "bloating. He denies diarrhea.    Health Maintenance: Completed    Objective:     Exam:  /70 (BP Location: Right arm, Patient Position: Sitting, BP Cuff Size: Adult)   Pulse 89   Temp 36.7 °C (98.1 °F) (Temporal)   Ht 1.6 m (5' 3\")   Wt 66.7 kg (147 lb)   SpO2 98%   BMI 26.04 kg/m²  Body mass index is 26.04 kg/m².    Physical Exam  Vitals reviewed.   Constitutional:       Appearance: Normal appearance.   HENT:      Head: Normocephalic and atraumatic.      Nose: Nose normal.   Cardiovascular:      Rate and Rhythm: Normal rate and regular rhythm.      Pulses: Normal pulses.      Heart sounds: Normal heart sounds. No murmur heard.  Pulmonary:      Effort: Pulmonary effort is normal.      Breath sounds: Normal breath sounds.   Abdominal:      General: Abdomen is flat.      Palpations: Abdomen is soft.   Skin:     General: Skin is warm and dry.   Neurological:      Mental Status: He is alert.   Psychiatric:         Mood and Affect: Mood normal.         Behavior: Behavior normal.             Results      Assessment & Plan:     1. Homicidal ideation        2. Anxiety            Assessment & Plan  1. Anxiety.  His symptoms have shown improvement compared to the previous month, though he reports some persistent side effects such as jaw clenching and forehead tension. He also experiences vivid dreams and occasional paranoia. The dosage of Lexapro will be increased to 20 mg, to be taken once daily. He is advised to take two pills at the same time. If symptoms persist, the addition of a low dose of Abilify will be considered. He is also encouraged to continue with behavioral therapy and CBT to help manage his anxiety and paranoia.    2. Prostatitis.  He is advised to take both Lexapro and Flomax in the morning to potentially reduce morning grogginess. He is also advised to avoid alcohol and caffeine, as these can exacerbate prostatitis symptoms. He will undergo an MRI today as part of the urology follow-up.    3. " Bloating.  He reports bloating after consuming gluten. He is advised to avoid gluten in his diet, especially corn flour and certain types of bread, to see if this alleviates his symptoms.    Follow-up  Patient is scheduled for a follow-up visit in mid to late January 2025.          Return in about 6 weeks (around 1/20/2025) for Depression, PHQ9, Anxiety, GAD7.    Please note that this dictation was created using voice recognition software. I have made every reasonable attempt to correct obvious errors, but I expect that there are errors of grammar and possibly content that I did not discover before finalizing the note.    Valerie Samayoa MD  Family Medicine and Non - Operative Sports Medicine   [facility]

## 2024-12-13 DIAGNOSIS — D49.89: ICD-10-CM

## 2024-12-30 NOTE — PROGRESS NOTES
Subjective:   12/31/2024 11:09 AM  Primary care physician: Valerie Samayoa M.D.  Referring Provider: Jose Yost M.D.    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 male who initially presented to his urologist for hematuria and urinary symptoms. CT ABD/PELVIS UROGRAM on 12/9/24 noted mesenteric adenopathy with 3.5 x 1 cm right mesenteric calcified node. He was subsequently referred to surgical oncology.    He is here today for further evaluation.  The patient is completely asymptomatic.  He denies any abdominal pain, flushing, diarrhea or any other abdominal symptoms.  His weight has been stable.    Past Medical History:   Diagnosis Date    Allergic rhinitis 5/6/2014    Allergy      History reviewed. No pertinent surgical history.  Allergies   Allergen Reactions    Pcn [Penicillins] Rash     Outpatient Encounter Medications as of 12/31/2024   Medication Sig Dispense Refill    escitalopram (LEXAPRO) 10 MG Tab Take 2 Tablets by mouth every day. 180 Tablet 3    tamsulosin (FLOMAX) 0.4 MG capsule Take 1 Capsule by mouth every day. (Patient not taking: Reported on 12/31/2024) 30 Capsule 2     No facility-administered encounter medications on file as of 12/31/2024.     Social History     Socioeconomic History    Marital status: Single     Spouse name: Not on file    Number of children: Not on file    Years of education: Not on file    Highest education level: Not on file   Occupational History    Not on file   Tobacco Use    Smoking status: Never    Smokeless tobacco: Never   Vaping Use    Vaping status: Never Used   Substance and Sexual Activity    Alcohol use: Yes     Comment: occ    Drug use: No    Sexual  activity: Yes     Partners: Female     Birth control/protection: Condom   Other Topics Concern    Behavioral problems No    Interpersonal relationships No    Sad or not enjoying activities No    Suicidal thoughts No    Poor school performance No    Reading difficulties No    Speech difficulties No    Writing difficulties No    Inadequate sleep No    Excessive TV viewing No    Excessive video game use No    Inadequate exercise No    Sports related No    Poor diet No    Family concerns for drug/alcohol abuse No    Poor oral hygiene No    Bike safety No    Family concerns vehicle safety No   Social History Narrative    Not on file     Social Drivers of Health     Financial Resource Strain: Not on file   Food Insecurity: Not on file   Transportation Needs: Not on file   Physical Activity: Not on file   Stress: Not on file   Social Connections: Not on file   Intimate Partner Violence: Not on file   Housing Stability: Not on file      Social History     Tobacco Use   Smoking Status Never   Smokeless Tobacco Never     Social History     Substance and Sexual Activity   Alcohol Use Yes    Comment: occ     Social History     Substance and Sexual Activity   Drug Use No      Family History   Problem Relation Age of Onset    Psychiatric Illness Mother     Hypertension Mother     Diabetes Mother     Psychiatric Illness Father     No Known Problems Sister     No Known Problems Brother     Psychiatric Illness Brother        Review of Systems   Constitutional:  Negative for chills and fever.   Gastrointestinal:  Negative for abdominal pain and vomiting.   Genitourinary:  Negative for dysuria, flank pain, frequency, hematuria and urgency.   All other systems reviewed and are negative.       Objective:   /68 (BP Location: Left arm, Patient Position: Sitting, BP Cuff Size: Adult)   Pulse 66   Temp 36.2 °C (97.2 °F) (Temporal)   Wt 68 kg (150 lb)   SpO2 97%   BMI 26.57 kg/m²     Physical Exam  Constitutional:       General:  He is not in acute distress.     Appearance: Normal appearance. He is normal weight. He is not ill-appearing or toxic-appearing.   HENT:      Head: Normocephalic and atraumatic.      Nose: Nose normal.      Mouth/Throat:      Mouth: Mucous membranes are moist.   Eyes:      General: No scleral icterus.     Extraocular Movements: Extraocular movements intact.      Pupils: Pupils are equal, round, and reactive to light.   Cardiovascular:      Rate and Rhythm: Normal rate and regular rhythm.   Pulmonary:      Effort: Pulmonary effort is normal.      Breath sounds: Normal breath sounds.   Abdominal:      General: Abdomen is flat.      Palpations: Abdomen is soft. There is no mass.      Tenderness: There is no abdominal tenderness.   Musculoskeletal:      Cervical back: Neck supple.   Neurological:      Mental Status: He is alert.         Labs   Latest Reference Range & Units 09/09/24 08:53   WBC 4.8 - 10.8 K/uL 4.9   RBC 4.70 - 6.10 M/uL 5.42   Hemoglobin 14.0 - 18.0 g/dL 15.8   Hematocrit 42.0 - 52.0 % 46.1   MCV 81.4 - 97.8 fL 85.1   MCH 27.0 - 33.0 pg 29.2   MCHC 32.3 - 36.5 g/dL 34.3   RDW 35.9 - 50.0 fL 36.4   Platelet Count 164 - 446 K/uL 275   MPV 9.0 - 12.9 fL 10.0      Latest Reference Range & Units 09/09/24 08:53   Sodium 135 - 145 mmol/L 137   Potassium 3.6 - 5.5 mmol/L 4.2   Chloride 96 - 112 mmol/L 100   Co2 20 - 33 mmol/L 25   Anion Gap 7.0 - 16.0  12.0   Glucose 65 - 99 mg/dL 96   Bun 8 - 22 mg/dL 17   Creatinine 0.50 - 1.40 mg/dL 0.75   GFR (CKD-EPI) >60 mL/min/1.73 m 2 128   Calcium 8.5 - 10.5 mg/dL 9.9   Correct Calcium 8.5 - 10.5 mg/dL 9.1   AST(SGOT) 12 - 45 U/L 24   ALT(SGPT) 2 - 50 U/L 35   Alkaline Phosphatase 30 - 99 U/L 113 (H)   Total Bilirubin 0.1 - 1.5 mg/dL 0.7   Albumin 3.2 - 4.9 g/dL 5.0 (H)   Total Protein 6.0 - 8.2 g/dL 8.0   Globulin 1.9 - 3.5 g/dL 3.0   A-G Ratio g/dL 1.7   (H): Data is abnormally high     Imaging  CT ABD/PELVIS UROGRAM 12/9/24  IMPRESSION:  1. Mesenteric adenopathy  with 3.5 x 1 cm right mesenteric calcified node. This is likely an old granulomatous lymph nodes. Other processes such as carcinoid not excluded. Follow-up recommended.  2. Kidneys, ureters and bladder appear normal. No prostate enlargement, inflammation or fluid evident.    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    We need to rule out a neuroendocrine tumor.  I will order a chromogranin A level.    He will also need additional imaging but I would like to talk to radiology to sort out the best test and I will call the patient with that information on Thursday.    I will see him back once that is complete.    Greater than 45 minutes were spent with the patient.  This included review of outside records and imaging, counseling of the patient and coordination of care.    I, Dr. Lantigua, have entered, reviewed and confirmed the above diagnoses related to this patient on this date of service, as per the time and date noted at top of this note.

## 2024-12-31 ENCOUNTER — OFFICE VISIT (OUTPATIENT)
Dept: SURGICAL ONCOLOGY | Facility: MEDICAL CENTER | Age: 24
End: 2024-12-31
Payer: COMMERCIAL

## 2024-12-31 VITALS
BODY MASS INDEX: 26.57 KG/M2 | WEIGHT: 150 LBS | DIASTOLIC BLOOD PRESSURE: 68 MMHG | TEMPERATURE: 97.2 F | OXYGEN SATURATION: 97 % | HEART RATE: 66 BPM | SYSTOLIC BLOOD PRESSURE: 114 MMHG

## 2024-12-31 DIAGNOSIS — D49.89: ICD-10-CM

## 2024-12-31 ASSESSMENT — ENCOUNTER SYMPTOMS
CHILLS: 0
ABDOMINAL PAIN: 0
FEVER: 0
FLANK PAIN: 0
VOMITING: 0

## 2024-12-31 ASSESSMENT — FIBROSIS 4 INDEX: FIB4 SCORE: 0.38

## 2025-01-06 ENCOUNTER — HOSPITAL ENCOUNTER (OUTPATIENT)
Dept: LAB | Facility: MEDICAL CENTER | Age: 25
End: 2025-01-06
Attending: SURGERY
Payer: COMMERCIAL

## 2025-01-06 DIAGNOSIS — D49.89: ICD-10-CM

## 2025-01-06 PROCEDURE — 36415 COLL VENOUS BLD VENIPUNCTURE: CPT

## 2025-01-06 PROCEDURE — 86316 IMMUNOASSAY TUMOR OTHER: CPT

## 2025-01-09 LAB — CGA SERPL-MCNC: 41 NG/ML (ref 0–187)

## 2025-01-17 ENCOUNTER — TELEPHONE (OUTPATIENT)
Dept: SURGICAL ONCOLOGY | Facility: MEDICAL CENTER | Age: 25
End: 2025-01-17
Payer: COMMERCIAL

## 2025-01-17 NOTE — TELEPHONE ENCOUNTER
Called patient to schedule a follow up with Dr. Lantigua, left patient a voicemail to call back and schedule.

## 2025-01-21 NOTE — PROGRESS NOTES
Subjective:   1/29/2025 10:26 AM  Primary care physician: Valerie Samayoa M.D.  Referring Provider: Jose Yost M.D.     Chief Complaint:   Chief Complaint   Patient presents with    Follow-Up     MESENT ADENOPATHY  CT 12/9: 3.5 x 1 CM   R MESENTERIC CALC NODE  CHROM A: WNL        Diagnosis:   1. Neoplasm of mesenteric lymph nodes          History of presenting illness:    Jeffry Gómez is a pleasant 24 y.o. male who initially presented to his urologist for hematuria and urinary symptoms. CT ABD/PELVIS UROGRAM on 12/9/24 noted mesenteric adenopathy with 3.5 x 1 cm right mesenteric calcified node. He was subsequently referred to surgical oncology.     He is here today for further evaluation. The patient is completely asymptomatic.  He denies any abdominal pain, flushing, diarrhea or any other abdominal symptoms.  His weight has been stable.    Update 1/29/25  Chromogranin A on 1/6/25 was 41, WNL    He is here today for further evaluation.  He is feeling well.      Past Medical History:   Diagnosis Date    Allergic rhinitis 5/6/2014    Allergy      No past surgical history on file.  Allergies   Allergen Reactions    Pcn [Penicillins] Rash     Outpatient Encounter Medications as of 1/29/2025   Medication Sig Dispense Refill    escitalopram (LEXAPRO) 10 MG Tab Take 2 Tablets by mouth every day. 180 Tablet 3    [DISCONTINUED] tamsulosin (FLOMAX) 0.4 MG capsule Take 1 Capsule by mouth every day. (Patient not taking: Reported on 12/31/2024) 30 Capsule 2     No facility-administered encounter medications on file as of 1/29/2025.     Social History     Socioeconomic History    Marital status: Single     Spouse name: Not on file    Number of children: Not on file    Years of education: Not on file    Highest education level: Not on file   Occupational History    Not on file   Tobacco Use    Smoking status: Never    Smokeless tobacco: Never   Vaping Use    Vaping status:  Never Used   Substance and Sexual Activity    Alcohol use: Yes     Comment: occ    Drug use: No    Sexual activity: Yes     Partners: Female     Birth control/protection: Condom   Other Topics Concern    Behavioral problems No    Interpersonal relationships No    Sad or not enjoying activities No    Suicidal thoughts No    Poor school performance No    Reading difficulties No    Speech difficulties No    Writing difficulties No    Inadequate sleep No    Excessive TV viewing No    Excessive video game use No    Inadequate exercise No    Sports related No    Poor diet No    Family concerns for drug/alcohol abuse No    Poor oral hygiene No    Bike safety No    Family concerns vehicle safety No   Social History Narrative    Not on file     Social Drivers of Health     Financial Resource Strain: Not on file   Food Insecurity: Not on file   Transportation Needs: Not on file   Physical Activity: Not on file   Stress: Not on file   Social Connections: Not on file   Intimate Partner Violence: Not on file   Housing Stability: Not on file      Social History     Tobacco Use   Smoking Status Never   Smokeless Tobacco Never     Social History     Substance and Sexual Activity   Alcohol Use Yes    Comment: occ     Social History     Substance and Sexual Activity   Drug Use No      Family History   Problem Relation Age of Onset    Psychiatric Illness Mother     Hypertension Mother     Diabetes Mother     Psychiatric Illness Father     No Known Problems Sister     No Known Problems Brother     Psychiatric Illness Brother        Review of Systems   Constitutional:  Negative for chills and fever.   Gastrointestinal:  Negative for abdominal pain and vomiting.   Genitourinary:  Negative for dysuria, flank pain, frequency, hematuria and urgency.   All other systems reviewed and are negative.       Objective:   /68 (BP Location: Right arm, Patient Position: Sitting)   Pulse 96   Temp 36.8 °C (98.3 °F) (Temporal)   Ht 1.6 m (5'  "3\")   Wt 66.7 kg (147 lb)   SpO2 (!) 77%   BMI 26.04 kg/m²     Physical Exam  Constitutional:       General: He is not in acute distress.     Appearance: Normal appearance. He is normal weight. He is not ill-appearing or toxic-appearing.   HENT:      Head: Normocephalic and atraumatic.      Nose: Nose normal.      Mouth/Throat:      Mouth: Mucous membranes are moist.   Eyes:      General: No scleral icterus.     Extraocular Movements: Extraocular movements intact.      Pupils: Pupils are equal, round, and reactive to light.   Cardiovascular:      Rate and Rhythm: Normal rate and regular rhythm.   Pulmonary:      Effort: Pulmonary effort is normal.      Breath sounds: Normal breath sounds.   Abdominal:      General: Abdomen is flat.      Palpations: Abdomen is soft. There is no mass.      Tenderness: There is no abdominal tenderness.   Musculoskeletal:      Cervical back: Neck supple.   Neurological:      Mental Status: He is alert.         Labs    Latest Reference Range & Units 09/09/24 08:53   WBC 4.8 - 10.8 K/uL 4.9   RBC 4.70 - 6.10 M/uL 5.42   Hemoglobin 14.0 - 18.0 g/dL 15.8   Hematocrit 42.0 - 52.0 % 46.1   MCV 81.4 - 97.8 fL 85.1   MCH 27.0 - 33.0 pg 29.2   MCHC 32.3 - 36.5 g/dL 34.3   RDW 35.9 - 50.0 fL 36.4   Platelet Count 164 - 446 K/uL 275   MPV 9.0 - 12.9 fL 10.0        Latest Reference Range & Units 09/09/24 08:53   Sodium 135 - 145 mmol/L 137   Potassium 3.6 - 5.5 mmol/L 4.2   Chloride 96 - 112 mmol/L 100   Co2 20 - 33 mmol/L 25   Anion Gap 7.0 - 16.0  12.0   Glucose 65 - 99 mg/dL 96   Bun 8 - 22 mg/dL 17   Creatinine 0.50 - 1.40 mg/dL 0.75   GFR (CKD-EPI) >60 mL/min/1.73 m 2 128   Calcium 8.5 - 10.5 mg/dL 9.9   Correct Calcium 8.5 - 10.5 mg/dL 9.1   AST(SGOT) 12 - 45 U/L 24   ALT(SGPT) 2 - 50 U/L 35   Alkaline Phosphatase 30 - 99 U/L 113 (H)   Total Bilirubin 0.1 - 1.5 mg/dL 0.7   Albumin 3.2 - 4.9 g/dL 5.0 (H)   Total Protein 6.0 - 8.2 g/dL 8.0   Globulin 1.9 - 3.5 g/dL 3.0   A-G Ratio g/dL 1.7 "   (H): Data is abnormally high       Latest Reference Range & Units 01/06/25 12:39   Chromogranin A 0 - 187 ng/mL 41     Imaging  CT ABD/PELVIS UROGRAM 12/9/24  IMPRESSION:  1. Mesenteric adenopathy with 3.5 x 1 cm right mesenteric calcified node. This is likely an old granulomatous lymph nodes. Other processes such as carcinoid not excluded. Follow-up recommended.  2. Kidneys, ureters and bladder appear normal. No prostate enlargement, inflammation or fluid evident.     Pathology  N/A     Procedures  N/A    Diagnosis:     1. Neoplasm of mesenteric lymph nodes            Medical Decision Making:  Today's Assessment / Status / Plan:     The patient has some calcified lymph nodes in his peritoneal cavity.  I went over his films with radiology and I do not believe that these are neuroendocrine in nature.  He also did have a normal chromogranin A.    I think be reasonable to repeat a CT in 1 years time.  I will see him back after that.    The patient is comfortable with this plan.    Greater than 45 minutes were spent with the patient.  This included review of outside records and imaging, counseling of the patient and coordination of care.    I, Dr Lantigua, have entered, reviewed and confirmed the above diagnoses related to this patient on this date of service, as per the time and date noted at top of this note.

## 2025-01-29 ENCOUNTER — OFFICE VISIT (OUTPATIENT)
Dept: SURGICAL ONCOLOGY | Facility: MEDICAL CENTER | Age: 25
End: 2025-01-29
Payer: COMMERCIAL

## 2025-01-29 VITALS
WEIGHT: 147 LBS | HEIGHT: 63 IN | BODY MASS INDEX: 26.05 KG/M2 | TEMPERATURE: 98.3 F | DIASTOLIC BLOOD PRESSURE: 68 MMHG | OXYGEN SATURATION: 77 % | SYSTOLIC BLOOD PRESSURE: 122 MMHG | HEART RATE: 96 BPM

## 2025-01-29 DIAGNOSIS — D49.89: ICD-10-CM

## 2025-01-29 ASSESSMENT — FIBROSIS 4 INDEX: FIB4 SCORE: 0.38

## 2025-01-29 ASSESSMENT — ENCOUNTER SYMPTOMS
CHILLS: 0
FLANK PAIN: 0
ABDOMINAL PAIN: 0
VOMITING: 0
FEVER: 0

## 2025-03-03 ENCOUNTER — OFFICE VISIT (OUTPATIENT)
Dept: BEHAVIORAL HEALTH | Facility: CLINIC | Age: 25
End: 2025-03-03
Payer: COMMERCIAL

## 2025-03-03 VITALS — HEART RATE: 80 BPM | DIASTOLIC BLOOD PRESSURE: 64 MMHG | OXYGEN SATURATION: 97 % | SYSTOLIC BLOOD PRESSURE: 122 MMHG

## 2025-03-03 DIAGNOSIS — F33.1 MDD (MAJOR DEPRESSIVE DISORDER), RECURRENT EPISODE, MODERATE (HCC): ICD-10-CM

## 2025-03-03 DIAGNOSIS — F41.1 GAD (GENERALIZED ANXIETY DISORDER): ICD-10-CM

## 2025-03-03 PROCEDURE — 99417 PROLNG OP E/M EACH 15 MIN: CPT | Performed by: STUDENT IN AN ORGANIZED HEALTH CARE EDUCATION/TRAINING PROGRAM

## 2025-03-03 PROCEDURE — 99205 OFFICE O/P NEW HI 60 MIN: CPT | Performed by: STUDENT IN AN ORGANIZED HEALTH CARE EDUCATION/TRAINING PROGRAM

## 2025-03-03 PROCEDURE — 3074F SYST BP LT 130 MM HG: CPT | Performed by: STUDENT IN AN ORGANIZED HEALTH CARE EDUCATION/TRAINING PROGRAM

## 2025-03-03 PROCEDURE — 3078F DIAST BP <80 MM HG: CPT | Performed by: STUDENT IN AN ORGANIZED HEALTH CARE EDUCATION/TRAINING PROGRAM

## 2025-03-03 ASSESSMENT — ANXIETY QUESTIONNAIRES
IF YOU CHECKED OFF ANY PROBLEMS ON THIS QUESTIONNAIRE, HOW DIFFICULT HAVE THESE PROBLEMS MADE IT FOR YOU TO DO YOUR WORK, TAKE CARE OF THINGS AT HOME, OR GET ALONG WITH OTHER PEOPLE: SOMEWHAT DIFFICULT
2. NOT BEING ABLE TO STOP OR CONTROL WORRYING: SEVERAL DAYS
1. FEELING NERVOUS, ANXIOUS, OR ON EDGE: SEVERAL DAYS
3. WORRYING TOO MUCH ABOUT DIFFERENT THINGS: MORE THAN HALF THE DAYS
7. FEELING AFRAID AS IF SOMETHING AWFUL MIGHT HAPPEN: NOT AT ALL
4. TROUBLE RELAXING: SEVERAL DAYS
6. BECOMING EASILY ANNOYED OR IRRITABLE: NOT AT ALL
GAD7 TOTAL SCORE: 5
5. BEING SO RESTLESS THAT IT IS HARD TO SIT STILL: NOT AT ALL

## 2025-03-03 ASSESSMENT — PATIENT HEALTH QUESTIONNAIRE - PHQ9
CLINICAL INTERPRETATION OF PHQ2 SCORE: 1
SUM OF ALL RESPONSES TO PHQ QUESTIONS 1-9: 10
5. POOR APPETITE OR OVEREATING: 1 - SEVERAL DAYS

## 2025-03-03 NOTE — PROGRESS NOTES
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 crisis hotline number 988. Calling 911. Utilizing the ED in the event of an emergency.      The proposed treatment plan was discussed with the patient who was provided the opportunity to ask questions and make suggestions regarding alternative treatment. Patient verbalized understanding and expressed agreement with the plan.     Thank you for allowing me to participate in the care of this patient.    Provider Signature: Marcial Washington M.D. 3/3/2025             PAST MEDICAL / SURGICAL HISTORY, ALLERGIES, CURRENT MEDICATIONS        Past Medical History:   Diagnosis Date    Allergic rhinitis 5/6/2014    Allergy     No past surgical history on file.     Allergies   Allergen Reactions    Gluten Meal     Pcn [Penicillins] Rash         Current Outpatient Medications   Medication Sig Dispense Refill    escitalopram (LEXAPRO) 10 MG Tab Take 2 Tablets by mouth every day. 180 Tablet 3     No current facility-administered medications for this visit.        Provider Signature: Marcial Washington M.D. 3/3/2025      NOTE: ?Portions of this note were created using voice recognition software. ?Minor syntax errors, grammatical content or spelling, and punctuation errors may have occurred unintentionally. ?Please notify the author if changes are necessary or if the meaning of any statement is unclear.

## 2025-03-03 NOTE — PATIENT INSTRUCTIONS
[email or web address]    [email or web address]    Therapy in a Nutshell    Crisis Plan:  Calling clinic. Calling a 24-hour crisis hotline number 988. Calling 911. Utilizing the ED in the event of an emergency.     1-800-QUIT-NOW (351-521-0144) or enroll online at www.[email or web address]    [place] [facility]    [place] [facility]    [facility]  Address: [address] [place] [address]  Phone: [phone]  Notes: [facility] is a consortium of private practitioners who advertise together, and are in business separately as the following: Merissa Alfaro, Marriage and Family Therapist, Licensed Clinical Alcohol & Drug Counselor; Stephanie Schilling, [facility] Practitioner; Winter Tejada, Occupational Therapist and Reiki Master; Meagan Cabrera, Marriage and Family Therapist  [facility]  Address: [address] [place] [address] [place] [address]  Phone: [phone]  Notes: ADHD skills building, ACT Therapy, CBT, EMDR, Trauma focused therapy, Play based therapy, Family therapy  [facility]  Address: [address], [place] [address]  Phone: [phone]  Notes: Depression, Anxiety and stress management, marriage and family, DBT, EMDR  [facility]  Address: [address] [place] [address]  Phone: [phone]  Notes: wellness, mindfulness, self-care, LGBTQ+, goal setting and achievement   [facility]  Address: [address], [place] [address]  Phone: [phone]  Notes: Cognitive Behavioral Therapy (CBT), Dialectical Behavioral Therapy (DBT), Emotionally Focused Therapy (EFT), Gottman Couples Therapy, Positive Discipline and Child Education, Play Therapy  [facility]  Address: [address] [address]  Phone: [phone]  Notes: substance abuse, domestic violence, anger management, marriage and family counseling, mindfulness as well as Substance Abuse and Anger Management/Domestic Violence  Groups              [facility]: Merissa Marrufo Psy.D, Lluvia Fletcher LCSW, Kenzie Daley, PhD  Address: [address] [facility] [address]  Phone: [phone]  Notes: sleep disorders and health related concerns such as chronic pain, depression and anxiety  Yousuf Rubio, Ph.D at [facility]  Address: [address], [place] [address]  Phone: [phone]  Notes: sport psychology, resilience, motivation, self-talk, confidence  [facility]  Address: [address], [address], [place], [address]  Phone: [phone]  Notes: Certified [facility] ([facility]), offering peer services, case management, crisis intervention, Basic Skills Training, and Psycho-Social Rehabilitation; provides MAT for adolescent and adult opioid users, family therapy   [facility]  Address: [address] [place] [address]  Phone: [phone]  Notes: Many group class options, Domestic Violence, Anger Management, Irish speaking, Substance Abuse, Peaceful families parenting, Family therapy, Sliding scale fees  [facility]  Address: [address] [place] [address]  Phone: [phone]   Notes: EMDR, Irish speaking, Psychedelic assisted therapy, couples and family counseling  [facility]  Address: [address] [place] [address]  Phone: [phone]  Notes: Testing: Cognitive evaluations, Learning disability evaluations, ADHD evaluations, Pre-employment evaluations, Fitness for duty evaluations, Bariatric surgery evaluations, Pain procedure evaluations  [facility]  Address: [address], [place] [address]  Phone: [phone]  Notes: Borderline Personality Disorder, EMDR, Grief Counseling, Couples/family/parenting       Sleep Hygiene Recommendations:    Good sleep hygiene is an important basic treatment element for sleep disorders regardless  of the cause. Sleep hygiene education (outlined below) provides information about lifestyle and environmental factors that may help or hinder sleep. Sleep hygiene provides an essential foundation for other management approaches, but is not a sufficient treatment for insomnia on its own.    * Maintain a regular bedtime and wakeup schedule, even on weekends and days off. This is one of the most important ways to train your body to know when to sleep, and a regular routine helps regulate your body's internal clock.    * Make the last hour before bed a “wind-down” time. Have a light carbohydrate snack (e.g., crackers, bread, cereal) during this time. Don't engage in activities that are stimulating or mentally active (i.e. watching drama/thriller movie, discussion about charged topics such as finances, etc).    * Eat regular meals every day. Regularity in meals will also help to regulate your internal body clock.    * Limit liquid consumption to 8-10 oz in the evening.    * Avoid caffeinated products for several hours before bedtime. Remember even decaffeinated drinks have caffeine in them. It also takes longer to break down caffeine when you get older, so even 1 cup of coffee might linger around for longer.    * Do not consume alcohol too close to bedtime. When alcohol gets metabolized at night, this can actually lead to worsened sleep.    * Make sure your sleeping conditions, including your bed, are comfortable as possible. Wear loose fitting clothes if possible. Your room should be dark and quiet and minimize ambient light and sounds. If you are sharing a bed with a snoring, cover-stealing, or restless partner, make separate, temporary sleeping arrangements until you reestablish a satisfactory sleeping pattern.    * The temperature of your bedroom should be comfortable and on the cool side (around 65-68°F).    * Exercise regularly, but do not engage in activities that raise body temperature (e.g., warm baths, aerobic  activity) within 1.5 hours of bedtime.    * If you can’t sleep, get up and pursue some relaxing activity, such as reading or knitting, until you feel sleepy, do not lie in bed worrying about getting to sleep.    Taking medications to help with sleep:    Most authorities on sleep recommend against use of sedative drugs by these people for the following reasons:    Sedatives modify nervous system activity during sleep: for example, they may reduce the normal period of dreaming. After taking sedatives for a while and then stopping, many people report they have sleep-disrupting dreams, which cause them to wake up feeling tired even after a full night’s sleep.    The human body develops tolerance to sedatives after their repeated use. After a while, you have to take more and more sedatives to make you feel sleepy. A person can become psychologically dependent on sleeping preparations; if you are convinced that’s the only way you can get a good night’s sleep, you won’t be able to go to sleep without a drug.    Hypnotic / sedative medications should be used on a short-term basis, as a bridge to more long-term sustainable behavioral treatment (I.e. CBT-I therapy, sleep hygiene modification, etc).    Online CBT-I (cognitive behavioral therapy for insomnia) Resources:    https://www.ptsd.va.gov/appvid/mobile/insomnia_coach.asp  CBT-I charlee       Insomnia Treatment Options     CBT-Insomnia (#1 Treatment recommendation)  - Cognitive Behavioral Therapy (CBT) for insomnia has been found to be effective at promoting improved, healthy sleep.  - Unfortunately, we do not have enough psychologists providing this excellent treatment for insomnia but something you can do on your own.  Say Nati To Insomnia by Marco Webster. This a book where you learn about sleep and how sleep medicines actually do not help and then guides you through a 6 weeks of CBT for isomnia.        For Further Information     - I recommend the website:  https://sleepfoundation.org  - You can learn all about sleep and its health benefits as well as tips and products to help promote healthy sleep               Domestic Violence Resources            Homelessness Resources          Medical Resources          Housing Resources                Food Pantries        Postpartum support and resources

## 2025-03-19 ENCOUNTER — DOCUMENTATION (OUTPATIENT)
Dept: BEHAVIORAL HEALTH | Facility: CLINIC | Age: 25
End: 2025-03-19

## 2025-04-15 ENCOUNTER — OFFICE VISIT (OUTPATIENT)
Dept: BEHAVIORAL HEALTH | Facility: CLINIC | Age: 25
End: 2025-04-15
Payer: COMMERCIAL

## 2025-04-15 VITALS
DIASTOLIC BLOOD PRESSURE: 56 MMHG | HEART RATE: 84 BPM | BODY MASS INDEX: 26.04 KG/M2 | OXYGEN SATURATION: 96 % | SYSTOLIC BLOOD PRESSURE: 114 MMHG | WEIGHT: 147 LBS

## 2025-04-15 DIAGNOSIS — F41.1 GAD (GENERALIZED ANXIETY DISORDER): ICD-10-CM

## 2025-04-15 DIAGNOSIS — F33.1 MDD (MAJOR DEPRESSIVE DISORDER), RECURRENT EPISODE, MODERATE (HCC): ICD-10-CM

## 2025-04-15 PROCEDURE — 99215 OFFICE O/P EST HI 40 MIN: CPT | Performed by: STUDENT IN AN ORGANIZED HEALTH CARE EDUCATION/TRAINING PROGRAM

## 2025-04-15 PROCEDURE — 3074F SYST BP LT 130 MM HG: CPT | Performed by: STUDENT IN AN ORGANIZED HEALTH CARE EDUCATION/TRAINING PROGRAM

## 2025-04-15 PROCEDURE — 3078F DIAST BP <80 MM HG: CPT | Performed by: STUDENT IN AN ORGANIZED HEALTH CARE EDUCATION/TRAINING PROGRAM

## 2025-04-15 RX ORDER — BUPROPION HYDROCHLORIDE 150 MG/1
150 TABLET ORAL EVERY MORNING
Qty: 30 TABLET | Refills: 4 | Status: SHIPPED | OUTPATIENT
Start: 2025-04-15

## 2025-04-15 ASSESSMENT — ANXIETY QUESTIONNAIRES
7. FEELING AFRAID AS IF SOMETHING AWFUL MIGHT HAPPEN: NOT AT ALL
1. FEELING NERVOUS, ANXIOUS, OR ON EDGE: SEVERAL DAYS
IF YOU CHECKED OFF ANY PROBLEMS ON THIS QUESTIONNAIRE, HOW DIFFICULT HAVE THESE PROBLEMS MADE IT FOR YOU TO DO YOUR WORK, TAKE CARE OF THINGS AT HOME, OR GET ALONG WITH OTHER PEOPLE: NOT DIFFICULT AT ALL
6. BECOMING EASILY ANNOYED OR IRRITABLE: SEVERAL DAYS
2. NOT BEING ABLE TO STOP OR CONTROL WORRYING: NOT AT ALL
3. WORRYING TOO MUCH ABOUT DIFFERENT THINGS: NOT AT ALL
GAD7 TOTAL SCORE: 4
4. TROUBLE RELAXING: SEVERAL DAYS
5. BEING SO RESTLESS THAT IT IS HARD TO SIT STILL: SEVERAL DAYS

## 2025-04-15 ASSESSMENT — FIBROSIS 4 INDEX: FIB4 SCORE: 0.38

## 2025-04-15 ASSESSMENT — PATIENT HEALTH QUESTIONNAIRE - PHQ9
5. POOR APPETITE OR OVEREATING: 1 - SEVERAL DAYS
SUM OF ALL RESPONSES TO PHQ QUESTIONS 1-9: 9
CLINICAL INTERPRETATION OF PHQ2 SCORE: 1

## 2025-04-15 NOTE — PATIENT INSTRUCTIONS
[email or web address]    [email or web address]    Therapy in a Nutshell    Crisis Plan:  Calling clinic. Calling a 24-hour crisis hotline number 988. Calling 911. Utilizing the ED in the event of an emergency.     1-800-QUIT-NOW (171-165-2956) or enroll online at [email or web address]    [place] [facility]    [place] [facility] Resources    [facility]  Address: [address] [place] [address]  Phone: [phone]  Notes: [facility] is a consortium of private practitioners who advertise together, and are in business separately as the following: Merissa Alfaro, Marriage and Family Therapist, Licensed Clinical Alcohol & Drug Counselor; Stephanie Schilling, [facility] Practitioner; Winter Tejada, Occupational Therapist and Reiki Master; Meagan Cabrera, Marriage and Family Therapist  [facility]  Address: [address] [place] [address] [place] [address]  Phone: [phone]  Notes: ADHD skills building, ACT Therapy, CBT, EMDR, Trauma focused therapy, Play based therapy, Family therapy  [facility]  Address: [address], [place] [address]  Phone: [phone]  Notes: Depression, Anxiety and stress management, marriage and family, DBT, EMDR  [facility]  Address: [address] [place] [address]  Phone: [phone]  Notes: wellness, mindfulness, self-care, LGBTQ+, goal setting and achievement   [facility]  Address: [address], [place] [address]  Phone: [phone]  Notes: Cognitive Behavioral Therapy (CBT), Dialectical Behavioral Therapy (DBT), Emotionally Focused Therapy (EFT), Gottman Couples Therapy, Positive Discipline and Child Education, Play Therapy  [facility]  Address: [address] [address]  Phone: [phone]  Notes: substance abuse, domestic violence, anger management, marriage and family counseling, mindfulness as well as Substance Abuse and Anger Management/Domestic Violence  Groups              [facility]: Merissa Marrufo Psy.D, Lluvia Fletcher LCSW, Kenzie Daley, PhD  Address: [address] [facility] [address]  Phone: [phone]  Notes: sleep disorders and health related concerns such as chronic pain, depression and anxiety  Yousuf Rubio, Ph.D at [facility] [employer] [facility]  Address: [address], [place] [address]  Phone: [phone]  Notes: sport psychology, resilience, motivation, self-talk, confidence  [facility]  Address: [address], [address], [facility], [address]  Phone: [phone]  Notes: Certified [facility] ([facility]), offering peer services, case management, crisis intervention, Basic Skills Training, and Psycho-Social Rehabilitation; provides MAT for adolescent and adult opioid users, family therapy   [facility]  Address: [address] [place] [address]  Phone: [phone]  Notes: Many group class options, Domestic Violence, Anger Management, Slovak speaking, Substance Abuse, Peaceful families parenting, Family therapy, Sliding scale fees  [facility]  Address: [address] [place] [address]  Phone: [phone]   Notes: EMDR, Slovak speaking, Psychedelic assisted therapy, couples and family counseling  [facility]  Address: [address] [place] [address]  Phone: [phone]  Notes: Testing: Cognitive evaluations, Learning disability evaluations, ADHD evaluations, Pre-employment evaluations, Fitness for duty evaluations, Bariatric surgery evaluations, Pain procedure evaluations  [facility]  Address: [address] [address]  Phone: [phone]  Notes: Borderline Personality Disorder, EMDR, Grief Counseling, Couples/family/parenting       Sleep Hygiene Recommendations:    Good sleep hygiene is an important basic treatment element for sleep disorders regardless  of the cause. Sleep hygiene education (outlined below) provides information about lifestyle and environmental factors that may help or hinder sleep. Sleep hygiene provides an essential foundation for other management approaches, but is not a sufficient treatment for insomnia on its own.    * Maintain a regular bedtime and wakeup schedule, even on weekends and days off. This is one of the most important ways to train your body to know when to sleep, and a regular routine helps regulate your body's internal clock.    * Make the last hour before bed a “wind-down” time. Have a light carbohydrate snack (e.g., crackers, bread, cereal) during this time. Don't engage in activities that are stimulating or mentally active (i.e. watching drama/thriller movie, discussion about charged topics such as finances, etc).    * Eat regular meals every day. Regularity in meals will also help to regulate your internal body clock.    * Limit liquid consumption to 8-10 oz in the evening.    * Avoid caffeinated products for several hours before bedtime. Remember even decaffeinated drinks have caffeine in them. It also takes longer to break down caffeine when you get older, so even 1 cup of coffee might linger around for longer.    * Do not consume alcohol too close to bedtime. When alcohol gets metabolized at night, this can actually lead to worsened sleep.    * Make sure your sleeping conditions, including your bed, are comfortable as possible. Wear loose fitting clothes if possible. Your room should be dark and quiet and minimize ambient light and sounds. If you are sharing a bed with a snoring, cover-stealing, or restless partner, make separate, temporary sleeping arrangements until you reestablish a satisfactory sleeping pattern.    * The temperature of your bedroom should be comfortable and on the cool side (around 65-68°F).    * Exercise regularly, but do not engage in activities that raise body temperature (e.g., warm baths, aerobic  activity) within 1.5 hours of bedtime.    * If you can’t sleep, get up and pursue some relaxing activity, such as reading or knitting, until you feel sleepy, do not lie in bed worrying about getting to sleep.    Taking medications to help with sleep:    Most authorities on sleep recommend against use of sedative drugs by these people for the following reasons:    Sedatives modify nervous system activity during sleep: for example, they may reduce the normal period of dreaming. After taking sedatives for a while and then stopping, many people report they have sleep-disrupting dreams, which cause them to wake up feeling tired even after a full night’s sleep.    The human body develops tolerance to sedatives after their repeated use. After a while, you have to take more and more sedatives to make you feel sleepy. A person can become psychologically dependent on sleeping preparations; if you are convinced that’s the only way you can get a good night’s sleep, you won’t be able to go to sleep without a drug.    Hypnotic / sedative medications should be used on a short-term basis, as a bridge to more long-term sustainable behavioral treatment (I.e. CBT-I therapy, sleep hygiene modification, etc).    Online CBT-I (cognitive behavioral therapy for insomnia) Resources:    https://www.ptsd.va.gov/appvid/mobile/insomnia_coach.asp  CBT-I charlee       Insomnia Treatment Options     CBT-Insomnia (#1 Treatment recommendation)  - Cognitive Behavioral Therapy (CBT) for insomnia has been found to be effective at promoting improved, healthy sleep.  - Unfortunately, we do not have enough psychologists providing this excellent treatment for insomnia but something you can do on your own.  Say Nati To Insomnia by Marco Webster. This a book where you learn about sleep and how sleep medicines actually do not help and then guides you through a 6 weeks of CBT for isomnia.        For Further Information     - I recommend the website:  https://sleepfoundation.org  - You can learn all about sleep and its health benefits as well as tips and products to help promote healthy sleep               Domestic Violence Resources            Homelessness Resources          Medical Resources          Housing Resources                Food Pantries        Postpartum support and resources

## 2025-04-15 NOTE — PROGRESS NOTES
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 additionally recently had been started in the context of mood and anxiety symptoms on Lexapro by his PCP with good effect patient currently on FDA maximum of Lexapro 20 mg and has been tolerating this without any particular issues currently, as well as improvement in regards to mood and anxiety symptoms.  Patient additionally has a history of HANANE being treated since 2022 with CPAP with good effect.  Patient additionally reports good lifestyle modifications in the context of treating his issues in regards to fatigue and decreased energy by engaging in exercise, as well as engaging in elimination diet eliminating gluten from his diet increasing his energy as well as alleviating some level of brain fog.  Provider did emphasize good lifestyle modifications in regards to regular exercise healthy diet, as well as good sleep hygiene.    Today on 4/15/2025, patient reporting overall that he has been doing well.  Patient denies any side effects in regards to his current medication regimen in the form of Lexapro 20 mg, as prescribed by the patient's PCP.  Patient reports that his primary issue is continued to be fatigued, and reports he has tried several over-the-counter supplements to help and reports some benefit from over-the-counter health supplements.  Patient was agreeable to a trial of Wellbutrin  mg to augment and boost the effects of the patient's antidepressant as  "well as to hopefully provide the patient with some level of energy throughout the day.  Provider did spend time educating the patient about grounding techniques as patient reports that he does frequently find himself \"disassociating.\"  Additionally provided education material of all the medications discussed today including the Wellbutrin XL.  Plan (include new prescriptions or refills):   -Continue Lexapro 20 mg for mood and anxiety symptoms  -Start Wellbutrin  mg for augmentation as well as for energy  -provider recommended patient establish with therapist/psychologist in order to strengthen and build coping skills to better process his mood and anxiety symptoms.  Patient reports overall good response on Lexapro, and is currently been tolerating Lexapro 20 mg, the FDA maximum of this antidepressant without any concerns.  Patient reports 2 main issues in regards to his level of fatigue during the day as well as his issues in regards to waking up in the morning.  Patient reports that in the lead up to this appointment he has been placed on medications as well as engage in good lifestyle modifications, as well as since at least 2022 has been utilizing his CPAP after being diagnosed with HANANE.  Provider did discuss in future appointments potentially discussing potential trial of Wellbutrin XL in order to potentially consider augmenting the patient's antidepressant as well as helping with a level of mood symptoms as well as energy symptoms in the morning, or during the day.  However did discuss with patient that when initially considered lifestyle modifications, prior to starting Wellbutrin.  Provider additionally extensively talked about lifestyle modifications in regards to healthy diet, regular exercise, as well as good sleep hygiene.  -Recommend updated labs including CMP, CBC, thyroid panel, Vit D, Vit B12, folate, thiamine; lipid panel, lithium level/Valproic acid level  --Discussed the risks, benefits, " alternatives associated with medications, patient verbalized understanding and denied any further questions or concerns and was in agreement with the treatment plan.      Pertinent Labs or imaging: Reviewed patient's most recent lab work on 9/9/2024 renal function and liver function within normal limits, patient did have low HDL of 22 as well as elevated LDL of 127, patient additionally had a low vitamin D at 24 TSH within normal limits       Medication options, alternatives (including no medications) and medication risks/benefits/side effects were discussed in detail.  Explained importance of contraceptive measures while on psychotropic medications, educated to let provider know if ever pregnant or wanting to become pregnant. Verbalized understanding.  The patient was advised to call, message provider on [email or web address], or come in to the clinic if symptoms worsen or if any future questions/issues regarding their medications arise; the patient verbalized understanding and agreement.    The patient was educated to call 911, call the suicide hotline, or go to local ER if having thoughts of suicide or homicide; verbalized understanding.      Total time spent on the day of encounter: 59 minutes.         Orders Placed This Encounter    buPROPion (WELLBUTRIN XL) 150 MG XL tablet        Requested Prescriptions     Signed Prescriptions Disp Refills    buPROPion (WELLBUTRIN XL) 150 MG XL tablet 30 Tablet 4     Sig: Take 1 Tablet by mouth every morning.         Return to clinic in [place] in about 3 weeks (around 5/6/2025).  or sooner if symptoms worsen.  Next Appointment:  instruction provided on how to make the next appointment.     This patient's overall prognosis is guarded.     Patient’s ability to adhere to treatment plan is guarded.      Crisis Plan:  Calling clinic. Calling a 24-hour crisis hotline number [phone]. Calling 911. Utilizing the ED in the event of an emergency.      The proposed treatment plan was discussed with the  patient who was provided the opportunity to ask questions and make suggestions regarding alternative treatment. Patient verbalized understanding and expressed agreement with the plan.     Thank you for allowing me to participate in the care of this patient.    Provider Signature: Marcial Washington M.D.             PAST MEDICAL / SURGICAL HISTORY, ALLERGIES, CURRENT MEDICATIONS        Past Medical History:   Diagnosis Date    Allergic rhinitis 5/6/2014    Allergy     History reviewed. No pertinent surgical history.     Allergies   Allergen Reactions    Gluten Meal     Pcn [Penicillins] Rash         Current Outpatient Medications   Medication Sig Dispense Refill    buPROPion (WELLBUTRIN XL) 150 MG XL tablet Take 1 Tablet by mouth every morning. 30 Tablet 4    escitalopram (LEXAPRO) 10 MG Tab Take 2 Tablets by mouth every day. 180 Tablet 3     No current facility-administered medications for this visit.        Provider Signature: Marcial Washington M.D.       NOTE: ?Portions of this note were created using voice recognition software. ?Minor syntax errors, grammatical content or spelling, and punctuation errors may have occurred unintentionally. ?Please notify the author if changes are necessary or if the meaning of any statement is unclear.

## 2025-06-10 DIAGNOSIS — F41.1 GAD (GENERALIZED ANXIETY DISORDER): ICD-10-CM

## 2025-06-10 DIAGNOSIS — F33.1 MDD (MAJOR DEPRESSIVE DISORDER), RECURRENT EPISODE, MODERATE (HCC): ICD-10-CM

## 2025-06-10 NOTE — TELEPHONE ENCOUNTER
pt is req a 90 day supply refill if possible b/c he is going to be out of town  Received request via: Patient    Was the patient seen in the last year in this department? Yes    Does the patient have an active prescription (recently filled or refills available) for medication(s) requested? No    Pharmacy Name: [id number]    Does the patient have nursing home Plus and need 100-day supply? (This applies to ALL medications) Patient does not have SCP

## 2025-06-11 RX ORDER — BUPROPION HYDROCHLORIDE 150 MG/1
150 TABLET ORAL EVERY MORNING
Qty: 90 TABLET | Refills: 0 | Status: SHIPPED | OUTPATIENT
Start: 2025-06-11 | End: 2025-09-09

## 2025-06-16 ENCOUNTER — APPOINTMENT (OUTPATIENT)
Dept: BEHAVIORAL HEALTH | Facility: CLINIC | Age: 25
End: 2025-06-16
Payer: COMMERCIAL

## 2025-07-08 DIAGNOSIS — F41.1 GAD (GENERALIZED ANXIETY DISORDER): ICD-10-CM

## 2025-07-08 DIAGNOSIS — F33.1 MDD (MAJOR DEPRESSIVE DISORDER), RECURRENT EPISODE, MODERATE (HCC): ICD-10-CM

## 2025-07-08 NOTE — TELEPHONE ENCOUNTER
Pt is out of town due to work and wondering if we can send over a refill for his Bupropion RX for the month that he will be out there? I have updated the pharmacy         Received request via: Patient    Was the patient seen in the last year in this department? Yes    Does the patient have an active prescription (recently filled or refills available) for medication(s) requested? No    Pharmacy Name: [id number]    Does the patient have [facility] and need 100-day supply? (This applies to ALL medications) Patient does not have SCP

## 2025-07-09 RX ORDER — BUPROPION HYDROCHLORIDE 150 MG/1
150 TABLET ORAL EVERY MORNING
Qty: 90 TABLET | Refills: 0 | Status: SHIPPED | OUTPATIENT
Start: 2025-07-09 | End: 2025-10-07

## 2025-07-09 RX ORDER — ESCITALOPRAM OXALATE 20 MG/1
20 TABLET ORAL DAILY
Qty: 90 TABLET | Refills: 0 | Status: SHIPPED | OUTPATIENT
Start: 2025-07-09

## 2025-08-08 ENCOUNTER — OFFICE VISIT (OUTPATIENT)
Dept: BEHAVIORAL HEALTH | Facility: CLINIC | Age: 25
End: 2025-08-08
Payer: COMMERCIAL

## 2025-08-08 VITALS
BODY MASS INDEX: 25.86 KG/M2 | SYSTOLIC BLOOD PRESSURE: 124 MMHG | WEIGHT: 146 LBS | OXYGEN SATURATION: 98 % | HEART RATE: 65 BPM | DIASTOLIC BLOOD PRESSURE: 58 MMHG

## 2025-08-08 DIAGNOSIS — F33.1 MDD (MAJOR DEPRESSIVE DISORDER), RECURRENT EPISODE, MODERATE (HCC): ICD-10-CM

## 2025-08-08 DIAGNOSIS — Z20.2 POSSIBLE EXPOSURE TO STI: ICD-10-CM

## 2025-08-08 DIAGNOSIS — Z11.3 SCREENING EXAMINATION FOR STI: Primary | ICD-10-CM

## 2025-08-08 DIAGNOSIS — F41.1 GAD (GENERALIZED ANXIETY DISORDER): ICD-10-CM

## 2025-08-08 PROCEDURE — 99417 PROLNG OP E/M EACH 15 MIN: CPT | Performed by: STUDENT IN AN ORGANIZED HEALTH CARE EDUCATION/TRAINING PROGRAM

## 2025-08-08 PROCEDURE — 3074F SYST BP LT 130 MM HG: CPT | Performed by: STUDENT IN AN ORGANIZED HEALTH CARE EDUCATION/TRAINING PROGRAM

## 2025-08-08 PROCEDURE — 99215 OFFICE O/P EST HI 40 MIN: CPT | Performed by: STUDENT IN AN ORGANIZED HEALTH CARE EDUCATION/TRAINING PROGRAM

## 2025-08-08 PROCEDURE — 3078F DIAST BP <80 MM HG: CPT | Performed by: STUDENT IN AN ORGANIZED HEALTH CARE EDUCATION/TRAINING PROGRAM

## 2025-08-08 RX ORDER — ESCITALOPRAM OXALATE 20 MG/1
20 TABLET ORAL DAILY
Qty: 90 TABLET | Refills: 0 | Status: SHIPPED | OUTPATIENT
Start: 2025-08-08

## 2025-08-08 RX ORDER — BUPROPION HYDROCHLORIDE 150 MG/1
150 TABLET ORAL EVERY MORNING
Qty: 90 TABLET | Refills: 0 | Status: SHIPPED | OUTPATIENT
Start: 2025-08-08 | End: 2025-11-06

## 2025-08-08 ASSESSMENT — ANXIETY QUESTIONNAIRES
3. WORRYING TOO MUCH ABOUT DIFFERENT THINGS: SEVERAL DAYS
GAD7 TOTAL SCORE: 2
1. FEELING NERVOUS, ANXIOUS, OR ON EDGE: NOT AT ALL
6. BECOMING EASILY ANNOYED OR IRRITABLE: NOT AT ALL
2. NOT BEING ABLE TO STOP OR CONTROL WORRYING: NOT AT ALL
4. TROUBLE RELAXING: NOT AT ALL
5. BEING SO RESTLESS THAT IT IS HARD TO SIT STILL: SEVERAL DAYS
7. FEELING AFRAID AS IF SOMETHING AWFUL MIGHT HAPPEN: NOT AT ALL
IF YOU CHECKED OFF ANY PROBLEMS ON THIS QUESTIONNAIRE, HOW DIFFICULT HAVE THESE PROBLEMS MADE IT FOR YOU TO DO YOUR WORK, TAKE CARE OF THINGS AT HOME, OR GET ALONG WITH OTHER PEOPLE: NOT DIFFICULT AT ALL

## 2025-08-08 ASSESSMENT — PATIENT HEALTH QUESTIONNAIRE - PHQ9
CLINICAL INTERPRETATION OF PHQ2 SCORE: 0
SUM OF ALL RESPONSES TO PHQ QUESTIONS 1-9: 3
5. POOR APPETITE OR OVEREATING: 0 - NOT AT ALL

## 2025-08-08 ASSESSMENT — FIBROSIS 4 INDEX: FIB4 SCORE: 0.38

## 2025-08-11 ENCOUNTER — HOSPITAL ENCOUNTER (OUTPATIENT)
Dept: LAB | Facility: MEDICAL CENTER | Age: 25
End: 2025-08-11
Attending: STUDENT IN AN ORGANIZED HEALTH CARE EDUCATION/TRAINING PROGRAM
Payer: COMMERCIAL

## 2025-08-11 DIAGNOSIS — Z11.3 SCREENING EXAMINATION FOR STI: ICD-10-CM

## 2025-08-11 DIAGNOSIS — Z20.2 POSSIBLE EXPOSURE TO STI: ICD-10-CM

## 2025-08-11 LAB
HBV CORE AB SERPL QL IA: NONREACTIVE
HBV SURFACE AB SERPL IA-ACNC: 4.08 MIU/ML (ref 0–10)
HBV SURFACE AG SER QL: NORMAL
HCV AB SER QL: NORMAL
HIV 1+2 AB+HIV1 P24 AG SERPL QL IA: NORMAL
T PALLIDUM AB SER QL IA: NORMAL

## 2025-08-11 PROCEDURE — 86803 HEPATITIS C AB TEST: CPT

## 2025-08-11 PROCEDURE — 87591 N.GONORRHOEAE DNA AMP PROB: CPT

## 2025-08-11 PROCEDURE — 87389 HIV-1 AG W/HIV-1&-2 AB AG IA: CPT

## 2025-08-11 PROCEDURE — 87340 HEPATITIS B SURFACE AG IA: CPT

## 2025-08-11 PROCEDURE — 86706 HEP B SURFACE ANTIBODY: CPT

## 2025-08-11 PROCEDURE — 86780 TREPONEMA PALLIDUM: CPT

## 2025-08-11 PROCEDURE — 87491 CHLMYD TRACH DNA AMP PROBE: CPT

## 2025-08-11 PROCEDURE — 86704 HEP B CORE ANTIBODY TOTAL: CPT

## 2025-08-11 PROCEDURE — 36415 COLL VENOUS BLD VENIPUNCTURE: CPT

## 2025-08-11 PROCEDURE — 87661 TRICHOMONAS VAGINALIS AMPLIF: CPT

## 2025-08-13 LAB
SPEC CONTAINER SPEC: NORMAL
SPECIMEN SOURCE: NORMAL
T VAGINALIS RRNA SPEC QL NAA+PROBE: NEGATIVE